# Patient Record
Sex: FEMALE | Race: WHITE | NOT HISPANIC OR LATINO | Employment: FULL TIME | URBAN - METROPOLITAN AREA
[De-identification: names, ages, dates, MRNs, and addresses within clinical notes are randomized per-mention and may not be internally consistent; named-entity substitution may affect disease eponyms.]

---

## 2019-11-06 ENCOUNTER — TRANSCRIBE ORDERS (OUTPATIENT)
Dept: NUTRITION | Facility: HOSPITAL | Age: 29
End: 2019-11-06

## 2019-11-06 DIAGNOSIS — E66.9 OBESITY, CLASS II, BMI 35-39.9: Primary | ICD-10-CM

## 2019-11-25 ENCOUNTER — APPOINTMENT (OUTPATIENT)
Dept: NUTRITION | Facility: HOSPITAL | Age: 29
End: 2019-11-25

## 2020-12-08 ENCOUNTER — LAB (OUTPATIENT)
Dept: LAB | Facility: HOSPITAL | Age: 30
End: 2020-12-08

## 2020-12-08 ENCOUNTER — OFFICE VISIT (OUTPATIENT)
Dept: NEUROLOGY | Facility: CLINIC | Age: 30
End: 2020-12-08

## 2020-12-08 VITALS
BODY MASS INDEX: 44.79 KG/M2 | DIASTOLIC BLOOD PRESSURE: 78 MMHG | HEIGHT: 62 IN | TEMPERATURE: 97.8 F | OXYGEN SATURATION: 99 % | SYSTOLIC BLOOD PRESSURE: 124 MMHG | HEART RATE: 84 BPM | WEIGHT: 243.4 LBS

## 2020-12-08 DIAGNOSIS — Z83.3 FAMILY HISTORY OF DIABETES MELLITUS (DM): ICD-10-CM

## 2020-12-08 DIAGNOSIS — Z83.3 FAMILY HISTORY OF DIABETES MELLITUS (DM): Primary | ICD-10-CM

## 2020-12-08 DIAGNOSIS — O24.419 GESTATIONAL DIABETES MELLITUS (GDM), ANTEPARTUM, GESTATIONAL DIABETES METHOD OF CONTROL UNSPECIFIED: ICD-10-CM

## 2020-12-08 DIAGNOSIS — G93.2 IIH (IDIOPATHIC INTRACRANIAL HYPERTENSION): ICD-10-CM

## 2020-12-08 PROBLEM — H47.10 PAPILLEDEMA: Status: ACTIVE | Noted: 2020-12-08

## 2020-12-08 LAB
BASOPHILS # BLD AUTO: 0.05 10*3/MM3 (ref 0–0.2)
BASOPHILS NFR BLD AUTO: 0.5 % (ref 0–1.5)
DEPRECATED RDW RBC AUTO: 41.5 FL (ref 37–54)
EOSINOPHIL # BLD AUTO: 0.08 10*3/MM3 (ref 0–0.4)
EOSINOPHIL NFR BLD AUTO: 0.7 % (ref 0.3–6.2)
ERYTHROCYTE [DISTWIDTH] IN BLOOD BY AUTOMATED COUNT: 12.9 % (ref 12.3–15.4)
HBA1C MFR BLD: 5.61 % (ref 4.8–5.6)
HCT VFR BLD AUTO: 41.9 % (ref 34–46.6)
HGB BLD-MCNC: 14 G/DL (ref 12–15.9)
IMM GRANULOCYTES # BLD AUTO: 0.04 10*3/MM3 (ref 0–0.05)
IMM GRANULOCYTES NFR BLD AUTO: 0.4 % (ref 0–0.5)
LYMPHOCYTES # BLD AUTO: 2.61 10*3/MM3 (ref 0.7–3.1)
LYMPHOCYTES NFR BLD AUTO: 24.1 % (ref 19.6–45.3)
MCH RBC QN AUTO: 29.5 PG (ref 26.6–33)
MCHC RBC AUTO-ENTMCNC: 33.4 G/DL (ref 31.5–35.7)
MCV RBC AUTO: 88.2 FL (ref 79–97)
MONOCYTES # BLD AUTO: 0.56 10*3/MM3 (ref 0.1–0.9)
MONOCYTES NFR BLD AUTO: 5.2 % (ref 5–12)
NEUTROPHILS NFR BLD AUTO: 69.1 % (ref 42.7–76)
NEUTROPHILS NFR BLD AUTO: 7.49 10*3/MM3 (ref 1.7–7)
NRBC BLD AUTO-RTO: 0 /100 WBC (ref 0–0.2)
PLATELET # BLD AUTO: 303 10*3/MM3 (ref 140–450)
PMV BLD AUTO: 10.8 FL (ref 6–12)
RBC # BLD AUTO: 4.75 10*6/MM3 (ref 3.77–5.28)
WBC # BLD AUTO: 10.83 10*3/MM3 (ref 3.4–10.8)

## 2020-12-08 PROCEDURE — 85025 COMPLETE CBC W/AUTO DIFF WBC: CPT

## 2020-12-08 PROCEDURE — 36415 COLL VENOUS BLD VENIPUNCTURE: CPT

## 2020-12-08 PROCEDURE — 80053 COMPREHEN METABOLIC PANEL: CPT

## 2020-12-08 PROCEDURE — 99244 OFF/OP CNSLTJ NEW/EST MOD 40: CPT | Performed by: NURSE PRACTITIONER

## 2020-12-08 PROCEDURE — 83036 HEMOGLOBIN GLYCOSYLATED A1C: CPT

## 2020-12-08 RX ORDER — ACYCLOVIR 400 MG/1
400 TABLET ORAL 2 TIMES DAILY
COMMUNITY

## 2020-12-08 NOTE — ASSESSMENT & PLAN NOTE
Ordered LP with opening and closing pressures     She will bring me her MRI disc for review     Discussed weight loss measures, she plans to see a weight loss clinic soon.     Discussed medication overuse HA, advised to decrease OTC NSAIDs and tylenol usage to less than 10 days per month.     Started on Aimovig for preventative treatment. First dose given in office, patient V/U of proper usage of autoinjector.     Elavil is contraindicated due to weight, BB contraindicated in the presence of low blood sugar sx.     Check labs for T2DM.     F/U after LP

## 2020-12-08 NOTE — PROGRESS NOTES
Subjective   Patient ID: Dominga Suarez is a 30 y.o. female     No chief complaint on file.       History of Present Illness  30 y.o. 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     HA frequency is daily, located in the occiput and behind bilateral eyes, characteristic of dull aching pain. Moderate to severe.   Associated sx of photophobia, sound sensitivity, blurry vision, nausea, vomiting, pulsatile tinnitus (intermittently), worse with bending over.   Abortive: ibuprofen, excedrin - intermittently helpful. Taking them daily, one or the other.     MRI completed and reportedly normal, she will bring me a disc.     Preventatives: Topamax ( not helpful), SSRI's, contraindicated for BB due to dizziness and low BS sx.     Denies constipation, has episodes of dizziness, shaking, and sweating will eat something sweet and feels better. Has a hx of gestational diabetes, is unsure if he she has been tested for DMT2.     Medical Records reviewed:   Patient with Optic nerve edema bilaterally, reported normal MRI. Has not had an LP completed. Feels like her eyes are swollen every morning, vision is blurry. PMH of migraine.       History reviewed. No pertinent past medical history.  Family History   Problem Relation Age of Onset   • Breast cancer Maternal Grandmother    • Colon cancer Paternal Grandfather      Social History     Socioeconomic History   • Marital status: Single     Spouse name: Not on file   • Number of children: Not on file   • Years of education: Not on file   • Highest education level: Not on file   Tobacco Use   • Smoking status: Current Every Day Smoker   • Smokeless tobacco: Never Used   Substance and Sexual Activity   • Alcohol use: Never     Frequency: Never   • Drug use: Never       Review of Systems   Constitutional: Negative for activity change, fatigue and unexpected weight change.   HENT: Positive for tinnitus. Negative for trouble swallowing.    Eyes: Positive for photophobia and visual  "disturbance.   Respiratory: Negative for apnea, cough and choking.    Cardiovascular: Negative for leg swelling.   Gastrointestinal: Positive for nausea and vomiting.   Endocrine: Negative for cold intolerance and heat intolerance.   Genitourinary: Negative for difficulty urinating, frequency, menstrual problem and urgency.   Musculoskeletal: Negative for back pain, gait problem, myalgias and neck pain.   Skin: Negative for color change and rash.   Allergic/Immunologic: Negative for immunocompromised state.   Neurological: Positive for dizziness and headaches. Negative for tremors, seizures, syncope, facial asymmetry, speech difficulty, weakness, light-headedness and numbness.   Hematological: Negative for adenopathy. Does not bruise/bleed easily.   Psychiatric/Behavioral: Negative for behavioral problems, confusion, decreased concentration, hallucinations and sleep disturbance.       Objective     Vitals:    12/08/20 0859   BP: 124/78   Pulse: 84   Temp: 97.8 °F (36.6 °C)   TempSrc: Temporal   SpO2: 99%   Weight: 110 kg (243 lb 6.4 oz)   Height: 157.5 cm (62\")       Neurologic Exam     Mental Status   Oriented to person, place, and time.   Registration: recalls 3 of 3 objects. Recall at 5 minutes: recalls 3 of 3 objects. Follows 3 step commands.   Attention: normal. Concentration: normal.   Speech: speech is normal   Level of consciousness: alert  Knowledge: good and consistent with education.   Able to name object. Able to read. Able to repeat. Able to write. Normal comprehension.     Cranial Nerves     CN II   Visual fields full to confrontation.   Visual acuity: normal  Right visual field deficit: none  Left visual field deficit: none     CN III, IV, VI   Extraocular motions are normal.   Right pupil: Shape: regular. Reactivity: brisk. Consensual response: intact.   Left pupil: Shape: regular. Reactivity: brisk. Consensual response: intact.   Nystagmus: none   Diplopia: none  Ophthalmoparesis: none  Upgaze: " normal  Downgaze: normal  Conjugate gaze: present  Vestibulo-ocular reflex: present    CN V   Facial sensation intact.   Right corneal reflex: normal  Left corneal reflex: normal    CN VII   Right facial weakness: none  Left facial weakness: none    CN VIII   Hearing: intact    CN IX, X   Palate: symmetric  Right gag reflex: normal  Left gag reflex: normal    CN XI   Right sternocleidomastoid strength: normal  Left sternocleidomastoid strength: normal    CN XII   Tongue: not atrophic  Fasciculations: absent  Tongue deviation: none    Motor Exam   Muscle bulk: normal  Overall muscle tone: normal  Right arm tone: normal  Left arm tone: normal  Right leg tone: normal  Left leg tone: normal    Strength   Strength 5/5 throughout.     Sensory Exam   Light touch normal.   Vibration normal.   Proprioception normal.   Pinprick normal.     Gait, Coordination, and Reflexes     Gait  Gait: normal    Coordination   Romberg: negative  Finger to nose coordination: normal  Heel to shin coordination: normal  Tandem walking coordination: normal    Tremor   Resting tremor: absent  Intention tremor: absent  Action tremor: absent    Reflexes   Reflexes 2+ except as noted.       Physical Exam  Vitals signs and nursing note reviewed.   Constitutional:       Appearance: Normal appearance.   HENT:      Head: Normocephalic and atraumatic.   Eyes:      Extraocular Movements: Extraocular movements intact and EOM normal.      Funduscopic exam:     Right eye: Papilledema present. No hemorrhage or exudate.         Left eye: Papilledema present. No hemorrhage or exudate.   Cardiovascular:      Rate and Rhythm: Normal rate.   Pulmonary:      Effort: Pulmonary effort is normal.   Skin:     General: Skin is warm and dry.      Capillary Refill: Capillary refill takes less than 2 seconds.   Neurological:      Mental Status: She is alert and oriented to person, place, and time.      Coordination: Finger-Nose-Finger Test, Heel to Shin Test and Romberg  Test normal.      Gait: Gait is intact. Tandem walk normal.      Deep Tendon Reflexes: Strength normal.   Psychiatric:         Mood and Affect: Mood normal.         Speech: Speech normal.         Behavior: Behavior normal.         No results found for any previous visit.         Assessment/Plan     Problem List Items Addressed This Visit        Endocrine    Gestational diabetes mellitus (GDM), antepartum    Current Assessment & Plan     Check labs for T2DM     Advised patient become established with a PCP, she states she has seen Dr. Seo in the past and may return to him.          Relevant Orders    CBC & Differential    Comprehensive Metabolic Panel    Hemoglobin A1c       Nervous and Auditory    IIH (idiopathic intracranial hypertension)    Current Assessment & Plan     Ordered LP with opening and closing pressures     She will bring me her MRI disc for review     Discussed weight loss measures, she plans to see a weight loss clinic soon.     Discussed medication overuse HA, advised to decrease OTC NSAIDs and tylenol usage to less than 10 days per month.     Started on Aimovig for preventative treatment. First dose given in office, patient V/U of proper usage of autoinjector.     Elavil is contraindicated due to weight, BB contraindicated in the presence of low blood sugar sx.     Check labs for T2DM.     F/U after LP          Relevant Orders    CT Lumbar Puncture Diagnostic      Other Visit Diagnoses     Family history of diabetes mellitus (DM)    -  Primary    Relevant Orders    CBC & Differential    Comprehensive Metabolic Panel    Hemoglobin A1c             Return for Recheck after LP .

## 2020-12-08 NOTE — ASSESSMENT & PLAN NOTE
Check labs for T2DM     Advised patient become established with a PCP, she states she has seen Dr. Seo in the past and may return to him.

## 2020-12-09 LAB
ALBUMIN SERPL-MCNC: 4.5 G/DL (ref 3.5–5.2)
ALBUMIN/GLOB SERPL: 1.4 G/DL
ALP SERPL-CCNC: 95 U/L (ref 39–117)
ALT SERPL W P-5'-P-CCNC: 22 U/L (ref 1–33)
ANION GAP SERPL CALCULATED.3IONS-SCNC: 10.4 MMOL/L (ref 5–15)
AST SERPL-CCNC: 18 U/L (ref 1–32)
BILIRUB SERPL-MCNC: 0.4 MG/DL (ref 0–1.2)
BUN SERPL-MCNC: 10 MG/DL (ref 6–20)
BUN/CREAT SERPL: 15.9 (ref 7–25)
CALCIUM SPEC-SCNC: 9.5 MG/DL (ref 8.6–10.5)
CHLORIDE SERPL-SCNC: 104 MMOL/L (ref 98–107)
CO2 SERPL-SCNC: 27.6 MMOL/L (ref 22–29)
CREAT SERPL-MCNC: 0.63 MG/DL (ref 0.57–1)
GFR SERPL CREATININE-BSD FRML MDRD: 110 ML/MIN/1.73
GLOBULIN UR ELPH-MCNC: 3.2 GM/DL
GLUCOSE SERPL-MCNC: 77 MG/DL (ref 65–99)
POTASSIUM SERPL-SCNC: 4.5 MMOL/L (ref 3.5–5.2)
PROT SERPL-MCNC: 7.7 G/DL (ref 6–8.5)
SODIUM SERPL-SCNC: 142 MMOL/L (ref 136–145)

## 2020-12-15 ENCOUNTER — TELEPHONE (OUTPATIENT)
Dept: NEUROLOGY | Facility: CLINIC | Age: 30
End: 2020-12-15

## 2020-12-15 NOTE — TELEPHONE ENCOUNTER
Labs are normal.   Her HA1C is slightly elevated but not elevated enough to be diabetic or pre-diabetic. I advise she watch her carb and sugar intake.

## 2020-12-22 ENCOUNTER — HOSPITAL ENCOUNTER (OUTPATIENT)
Dept: GENERAL RADIOLOGY | Facility: HOSPITAL | Age: 30
Discharge: HOME | End: 2020-12-22
Admitting: RADIOLOGY

## 2020-12-22 VITALS
RESPIRATION RATE: 12 BRPM | HEART RATE: 109 BPM | DIASTOLIC BLOOD PRESSURE: 63 MMHG | HEIGHT: 62 IN | OXYGEN SATURATION: 98 % | SYSTOLIC BLOOD PRESSURE: 119 MMHG | BODY MASS INDEX: 44.5 KG/M2 | TEMPERATURE: 98 F | WEIGHT: 241.8 LBS

## 2020-12-22 DIAGNOSIS — G93.2 IIH (IDIOPATHIC INTRACRANIAL HYPERTENSION): ICD-10-CM

## 2020-12-22 LAB
APPEARANCE CSF: CLEAR
COLOR CSF: COLORLESS
GLUCOSE CSF-MCNC: 65 MG/DL (ref 40–70)
PROT CSF-MCNC: 19.2 MG/DL (ref 15–45)
RBC # CSF MANUAL: 3 /MM3 (ref 0–5)
SPECIMEN VOL CSF: 10.5 ML
TUBE # CSF: 1
WBC # CSF MANUAL: 2 /MM3 (ref 0–5)

## 2020-12-22 PROCEDURE — 89050 BODY FLUID CELL COUNT: CPT | Performed by: NURSE PRACTITIONER

## 2020-12-22 PROCEDURE — 84157 ASSAY OF PROTEIN OTHER: CPT | Performed by: NURSE PRACTITIONER

## 2020-12-22 PROCEDURE — 88112 CYTOPATH CELL ENHANCE TECH: CPT | Performed by: NURSE PRACTITIONER

## 2020-12-22 PROCEDURE — 82945 GLUCOSE OTHER FLUID: CPT | Performed by: NURSE PRACTITIONER

## 2020-12-22 RX ORDER — LIDOCAINE HYDROCHLORIDE 10 MG/ML
5 INJECTION, SOLUTION EPIDURAL; INFILTRATION; INTRACAUDAL; PERINEURAL ONCE
Status: COMPLETED | OUTPATIENT
Start: 2020-12-22 | End: 2020-12-22

## 2020-12-22 RX ORDER — SODIUM CHLORIDE 0.9 % (FLUSH) 0.9 %
3 SYRINGE (ML) INJECTION EVERY 12 HOURS SCHEDULED
Status: DISCONTINUED | OUTPATIENT
Start: 2020-12-22 | End: 2020-12-23 | Stop reason: HOSPADM

## 2020-12-22 RX ORDER — SODIUM CHLORIDE 0.9 % (FLUSH) 0.9 %
10 SYRINGE (ML) INJECTION AS NEEDED
Status: DISCONTINUED | OUTPATIENT
Start: 2020-12-22 | End: 2020-12-23 | Stop reason: HOSPADM

## 2020-12-22 RX ADMIN — LIDOCAINE HYDROCHLORIDE 5 ML: 10 INJECTION, SOLUTION EPIDURAL; INFILTRATION; INTRACAUDAL; PERINEURAL at 11:09

## 2020-12-22 NOTE — POST-PROCEDURE NOTE
Radiology Procedure    Pre-procedure: procedure, risks discussed with patient. Patient indicated understanding and consented to procedure.   Procedure Performed: lumbar puncture     IV Sedation and/or Anesthesia:  No    Complications: none    Preliminary Findings: opening pressure 31cm H2O, closing pressure 19cm H2O    Specimen Removed: 10cc clear, colorless CSF    Estimated Blood Loss:  0ml    Post-Procedure Diagnosis: pending    Post-Procedure Plan: encourage fluids, bed rest x 2 hours    Standard Discharge Instructions Given:yes     CARROLL Lemon  12/22/20  11:03 EST

## 2020-12-23 ENCOUNTER — TELEPHONE (OUTPATIENT)
Dept: INFUSION THERAPY | Facility: HOSPITAL | Age: 30
End: 2020-12-23

## 2020-12-23 LAB
LAB AP CASE REPORT: NORMAL
PATH REPORT.FINAL DX SPEC: NORMAL

## 2020-12-28 ENCOUNTER — TELEPHONE (OUTPATIENT)
Dept: NEUROLOGY | Facility: CLINIC | Age: 30
End: 2020-12-28

## 2020-12-28 NOTE — TELEPHONE ENCOUNTER
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     Thanks!

## 2020-12-30 ENCOUNTER — OFFICE VISIT (OUTPATIENT)
Dept: NEUROLOGY | Facility: CLINIC | Age: 30
End: 2020-12-30

## 2020-12-30 ENCOUNTER — SPECIALTY PHARMACY (OUTPATIENT)
Dept: ONCOLOGY | Facility: HOSPITAL | Age: 30
End: 2020-12-30

## 2020-12-30 VITALS
HEIGHT: 62 IN | TEMPERATURE: 97.8 F | WEIGHT: 241 LBS | BODY MASS INDEX: 44.35 KG/M2 | DIASTOLIC BLOOD PRESSURE: 68 MMHG | SYSTOLIC BLOOD PRESSURE: 122 MMHG

## 2020-12-30 DIAGNOSIS — G93.2 IIH (IDIOPATHIC INTRACRANIAL HYPERTENSION): ICD-10-CM

## 2020-12-30 DIAGNOSIS — E66.01 MORBIDLY OBESE (HCC): ICD-10-CM

## 2020-12-30 PROCEDURE — 99212 OFFICE O/P EST SF 10 MIN: CPT | Performed by: NURSE PRACTITIONER

## 2020-12-30 RX ORDER — ACETAZOLAMIDE 250 MG/1
500 TABLET ORAL 2 TIMES DAILY
Qty: 120 TABLET | Refills: 2 | Status: SHIPPED | OUTPATIENT
Start: 2020-12-30 | End: 2022-03-01

## 2020-12-30 RX ORDER — ERENUMAB-AOOE 70 MG/ML
70 INJECTION SUBCUTANEOUS
Qty: 1 ML | Refills: 11 | Status: SHIPPED | OUTPATIENT
Start: 2020-12-30 | End: 2020-12-30

## 2020-12-30 RX ORDER — IBUPROFEN 800 MG/1
TABLET ORAL
COMMUNITY
Start: 2020-12-20 | End: 2022-03-01

## 2020-12-30 RX ORDER — SODIUM FLUORIDE 1.1 G/100G
GEL, DENTIFRICE ORAL
COMMUNITY
Start: 2020-12-21 | End: 2022-03-01

## 2020-12-30 RX ORDER — SUMATRIPTAN 50 MG/1
TABLET, FILM COATED ORAL
Qty: 12 TABLET | Refills: 2 | Status: SHIPPED | OUTPATIENT
Start: 2020-12-30 | End: 2022-03-04

## 2020-12-30 NOTE — PROGRESS NOTES
Subjective   Patient ID: Dominga Suarez is a 30 y.o. female     Chief Complaint   Patient presents with   • Idiopathic Intracranial Hypertension     Post Lumbar Puncture         History of Present Illness  30 y.o. 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 At last appointment on 12/8/20 ordered labs and LP, Started Aimovig.     LP 12/22/20 with opening pressure of 31 cm H2O. CSF: Cell count, Glucose, protein, cytology - NCS.     Labs 12/8/20: CBC, CMP - NCS, HA1C 5.6 - advised patient monitor sugar intake.     HA frequency 3-4 in the last month, mild improvement in associated symptoms, ibuprofen/excedrin continues to be non-abortive.  Denies side effects of Aimovig.      Planning for weight loss surgery in March.     Problem History:    HA frequency is daily, located in the occiput and behind bilateral eyes, characteristic of dull aching pain. Moderate to severe.   Associated sx of photophobia, sound sensitivity, blurry vision, nausea, vomiting, pulsatile tinnitus (intermittently), worse with bending over.   Abortive: ibuprofen, excedrin - intermittently helpful. Taking them daily, one or the other.     MRI completed and reportedly normal, she will bring me a disc.     Preventatives: Topamax ( not helpful), SSRI's, contraindicated for BB due to dizziness and low BS sx.     Denies constipation, has episodes of dizziness, shaking, and sweating will eat something sweet and feels better. Has a hx of gestational diabetes, is unsure if he she has been tested for DMT2.     Medical Records reviewed:   Patient with Optic nerve edema bilaterally, reported normal MRI. Has not had an LP completed. Feels like her eyes are swollen every morning, vision is blurry. PMH of migraine.       Past Medical History:   Diagnosis Date   • Calcium kidney stones    • Chronic headaches    • Herpes simplex      Family History   Problem Relation Age of Onset   • Breast cancer Maternal Grandmother    • Colon cancer Paternal Grandfather   "    Social History     Socioeconomic History   • Marital status: Single     Spouse name: Not on file   • Number of children: Not on file   • Years of education: Not on file   • Highest education level: Not on file   Tobacco Use   • Smoking status: Current Every Day Smoker   • Smokeless tobacco: Never Used   Substance and Sexual Activity   • Alcohol use: Never     Frequency: Never   • Drug use: Never   • Sexual activity: Defer       Review of Systems   Constitutional: Negative for activity change, fatigue and unexpected weight change.   HENT: Positive for tinnitus. Negative for trouble swallowing.    Eyes: Positive for photophobia and visual disturbance.   Respiratory: Negative for apnea, cough and choking.    Cardiovascular: Negative for leg swelling.   Gastrointestinal: Positive for nausea and vomiting.   Endocrine: Negative for cold intolerance and heat intolerance.   Genitourinary: Negative for difficulty urinating, frequency, menstrual problem and urgency.   Musculoskeletal: Negative for back pain, gait problem, myalgias and neck pain.   Skin: Negative for color change and rash.   Allergic/Immunologic: Negative for immunocompromised state.   Neurological: Positive for dizziness and headaches. Negative for tremors, seizures, syncope, facial asymmetry, speech difficulty, weakness, light-headedness and numbness.   Hematological: Negative for adenopathy. Does not bruise/bleed easily.   Psychiatric/Behavioral: Negative for behavioral problems, confusion, decreased concentration, hallucinations and sleep disturbance.       Objective     Vitals:    12/30/20 0928   BP: 122/68   Temp: 97.8 °F (36.6 °C)   Weight: 109 kg (241 lb)   Height: 157.5 cm (62\")       Neurologic Exam     Mental Status   Oriented to person, place, and time.   Follows 3 step commands.   Attention: normal. Concentration: normal.   Speech: speech is normal   Level of consciousness: alert  Knowledge: good and consistent with education.   Able to name " object. Able to read. Able to repeat. Able to write. Normal comprehension.     Cranial Nerves     CN II   Visual fields full to confrontation.   Visual acuity: normal  Right visual field deficit: none  Left visual field deficit: none     CN III, IV, VI   Extraocular motions are normal.   Right pupil: Shape: regular. Reactivity: brisk. Consensual response: intact.   Left pupil: Shape: regular. Reactivity: brisk. Consensual response: intact.   Nystagmus: none   Diplopia: none  Ophthalmoparesis: none  Upgaze: normal  Downgaze: normal  Conjugate gaze: present  Vestibulo-ocular reflex: present    CN V   Facial sensation intact.   Right corneal reflex: normal  Left corneal reflex: normal    CN VII   Right facial weakness: none  Left facial weakness: none    CN VIII   Hearing: intact    CN IX, X   Palate: symmetric  Right gag reflex: normal  Left gag reflex: normal    CN XI   Right sternocleidomastoid strength: normal  Left sternocleidomastoid strength: normal    CN XII   Tongue: not atrophic  Fasciculations: absent  Tongue deviation: none    Motor Exam   Muscle bulk: normal  Overall muscle tone: normal  Right arm tone: normal  Left arm tone: normal  Right leg tone: normal  Left leg tone: normal    Strength   Strength 5/5 throughout.     Sensory Exam   Light touch normal.     Gait, Coordination, and Reflexes     Gait  Gait: normal    Tremor   Resting tremor: absent  Intention tremor: absent  Action tremor: absent    Reflexes   Reflexes 2+ except as noted.       Physical Exam  Vitals signs and nursing note reviewed.   Constitutional:       Appearance: Normal appearance.   HENT:      Head: Normocephalic and atraumatic.   Eyes:      Extraocular Movements: EOM normal.   Neurological:      Mental Status: She is alert and oriented to person, place, and time.      Gait: Gait is intact.      Deep Tendon Reflexes: Strength normal.   Psychiatric:         Mood and Affect: Mood normal.         Speech: Speech normal.         Behavior:  Behavior normal.         Hospital Outpatient Visit on 12/22/2020   Component Date Value Ref Range Status   • Glucose, CSF 12/22/2020 65  40 - 70 mg/dL Final   • Case Report 12/22/2020    Final                    Value:Non-gynecologic Cytology                          Case: [id number]                                  Authorizing Provider:  Mary Jane Diaz,     Collected:           12/22/2020 11:11 AM                                 APRN                                                                         Ordering Location:     [facility]   Received:            12/22/2020 01:42 PM                                 XRAY                                                                         Pathologist:           Edi Hines MD                                                            Specimen:    CSF Sholes                                                                             • Final Diagnosis 12/22/2020    Final                    Value:This result contains rich text formatting which cannot be displayed here.   • Protein, Total (CSF) 12/22/2020 19.2  15.0 - 45.0 mg/dL Final   • WBC, CSF 12/22/2020 2  0 - 5 /mm3 Final   • RBC, CSF 12/22/2020 3  0 - 5 /mm3 Final   • Color, CSF 12/22/2020 Colorless  Colorless Final   • Appearance, CSF 12/22/2020 Clear  Clear Final   • Volume, CSF 12/22/2020 10.5  mL Final   • Tube Number, CSF 12/22/2020 1   Final         Assessment/Plan     Problem List Items Addressed This Visit        Other    IIH (idiopathic intracranial hypertension)    Current Assessment & Plan     Continue Aimovig     Start Diamox 250 mg PO BID X 1 week, then 500 mg PO BID thereafter     Start Imitrex 50 mg PO PRN for migraine, may repeat once in 2 hours if needed     F/U in 2 months or sooner if needed          Relevant Medications    acetaZOLAMIDE (DIAMOX) 250 MG tablet    SUMAtriptan (Imitrex) 50 MG tablet    Morbidly obese (CMS/HCC)    Current Assessment & Plan     Planning  for weight loss surgery in March     Advised patient to continue to work on weight loss and goals prior to surgery                   Return in about 2 months (around 2/28/2021).

## 2020-12-30 NOTE — ASSESSMENT & PLAN NOTE
Continue Aimovig     Start Diamox 250 mg PO BID X 1 week, then 500 mg PO BID thereafter     Start Imitrex 50 mg PO PRN for migraine, may repeat once in 2 hours if needed     F/U in 2 months or sooner if needed

## 2020-12-30 NOTE — ASSESSMENT & PLAN NOTE
Planning for weight loss surgery in March     Advised patient to continue to work on weight loss and goals prior to surgery

## 2021-01-27 ENCOUNTER — SPECIALTY PHARMACY (OUTPATIENT)
Dept: ONCOLOGY | Facility: HOSPITAL | Age: 31
End: 2021-01-27

## 2021-01-27 RX ORDER — ERENUMAB-AOOE 70 MG/ML
70 INJECTION SUBCUTANEOUS
Qty: 1 ML | Refills: 1 | Status: SHIPPED | OUTPATIENT
Start: 2021-01-27 | End: 2021-02-25

## 2021-01-27 RX ORDER — ERENUMAB-AOOE 70 MG/ML
70 INJECTION SUBCUTANEOUS
Qty: 1 ML | Refills: 11 | Status: SHIPPED | OUTPATIENT
Start: 2021-01-27 | End: 2021-01-27 | Stop reason: SDUPTHER

## 2021-01-27 NOTE — PROGRESS NOTES
Injectable Neurology Medication Follow-Up        Patient Name/:     Dominga Suarez    1990  Injectable Neurology Medication Regimen:  Aimovig 70mg SQ Q28 days  Date Started Medication: 2020               Initial Teaching Follow Up Comments      Safety      Storage instructions (away from children; away from heat/cold, sunlight, or moisture)       “How are you storing your medications?”, reminders on storage, proper handling (away from children, managing waste, etc.), disposal of medication with D/C or dosage change     Patient reports appropriate storage and handling.      Adherence       patient and/or caregiver on how to take medication, take with/without food, assess their adherence potential, stress importance of adherence, ways to manage adherence (pill boxes, phone reminders, calendars), what to do if miss a dose    “How are you taking your medication?” “How are you remembering to take your medication?”, “How many doses have you missed?”, determine reasons for non-adherence (not remembering, side effects, etc), ways to improve, overadherence? Remind patient of ways to improve/maintain adherence Confirmed dose of Aimovig 70mg SQ Q28 days (d/t insurance denial of 140mg dosage without trial of 70mg x 3 months). Discussed importance of compliance. LD given in office on 2020. Next self-injection of Aimovig due 2021. Script processed at [facility] and mailed to patient.     Side Effects/Adverse Reactions       patient on potential side effects, s/s, ways to manage, when to call MD/seek help       Determine if patient experiencing side effects, ways to manage  Pt denies adverse effects.      Miscellaneous      Food interactions, DDIs, financial issues Determine if patient started any new medications (analyze for DDI)       Additional Notes: Discussed aforementioned material with patient by phone. All questions and concerns addressed. Patient  provided with my contact information and instructed to call if any additional questions arise. Notified [facility] of refill request.

## 2021-02-25 ENCOUNTER — SPECIALTY PHARMACY (OUTPATIENT)
Dept: ONCOLOGY | Facility: HOSPITAL | Age: 31
End: 2021-02-25

## 2021-02-25 RX ORDER — ERENUMAB-AOOE 70 MG/ML
70 INJECTION SUBCUTANEOUS
Qty: 1 ML | Refills: 11 | Status: SHIPPED | OUTPATIENT
Start: 2021-02-25 | End: 2021-03-26

## 2021-02-25 NOTE — PROGRESS NOTES
Injectable Neurology Medication Follow-Up        Patient Name/:     Dominga Suarez    1990  Injectable Neurology Medication Regimen:  Aimovig 70mg SQ Q28 days  Date Started Medication: 2020               Initial Teaching Follow Up Comments      Safety      Storage instructions (away from children; away from heat/cold, sunlight, or moisture)       “How are you storing your medications?”, reminders on storage, proper handling (away from children, managing waste, etc.), disposal of medication with D/C or dosage change     Patient reports appropriate storage and handling.      Adherence       patient and/or caregiver on how to take medication, take with/without food, assess their adherence potential, stress importance of adherence, ways to manage adherence (pill boxes, phone reminders, calendars), what to do if miss a dose    “How are you taking your medication?” “How are you remembering to take your medication?”, “How many doses have you missed?”, determine reasons for non-adherence (not remembering, side effects, etc), ways to improve, overadherence? Remind patient of ways to improve/maintain adherence Confirmed dose of Aimovig 70mg SQ Q28 days (d/t insurance denial of 140mg dosage without trial of 70mg x 3 months). Discussed importance of compliance. LD given in office on 2020. Next self-injection of Aimovig due 3/2/2021. Refill processed at [facility] retail and mailed to patient.     Side Effects/Adverse Reactions       patient on potential side effects, s/s, ways to manage, when to call MD/seek help       Determine if patient experiencing side effects, ways to manage  Pt denies adverse effects.      Miscellaneous      Food interactions, DDIs, financial issues Determine if patient started any new medications (analyze for DDI)       Additional Notes: Discussed aforementioned material with patient by phone. All questions and concerns addressed. Patient  provided with my contact information and instructed to call if any additional questions arise. Notified [facility] of refill request.

## 2021-03-26 ENCOUNTER — SPECIALTY PHARMACY (OUTPATIENT)
Dept: ONCOLOGY | Facility: HOSPITAL | Age: 31
End: 2021-03-26

## 2021-03-26 NOTE — PROGRESS NOTES
Injectable Neurology Medication Follow-Up        Patient Name/:     Dominga Suarez    1990  Injectable Neurology Medication Regimen:  Aimovig 140mg SQ Q28 days  Date Started Medication: pending acquisition               Initial Teaching Follow Up Comments      Safety      Storage instructions (away from children; away from heat/cold, sunlight, or moisture)       “How are you storing your medications?”, reminders on storage, proper handling (away from children, managing waste, etc.), disposal of medication with D/C or dosage change     Patient reports appropriate storage and handling.      Adherence       patient and/or caregiver on how to take medication, take with/without food, assess their adherence potential, stress importance of adherence, ways to manage adherence (pill boxes, phone reminders, calendars), what to do if miss a dose    “How are you taking your medication?” “How are you remembering to take your medication?”, “How many doses have you missed?”, determine reasons for non-adherence (not remembering, side effects, etc), ways to improve, overadherence? Remind patient of ways to improve/maintain adherence Confirmed dose of Aimovig 140mg SQ Q28 days. Patient completed trial of  70mg x 3 months and PA was resubmitted for higher dosage and was approved. Discussed importance of compliance. Next self-injection of Aimovig due 3/30/2021. Refill processed at [facility] retail and mailed to patient.     Side Effects/Adverse Reactions       patient on potential side effects, s/s, ways to manage, when to call MD/seek help       Determine if patient experiencing side effects, ways to manage  Pt denies adverse effects.      Miscellaneous      Food interactions, DDIs, financial issues Determine if patient started any new medications (analyze for DDI) Scheduled delivery for 3/30.      Additional Notes: Discussed aforementioned material with patient by phone. All  questions and concerns addressed. Patient provided with my contact information and instructed to call if any additional questions arise. Notified [facility] of refill request.

## 2021-04-23 ENCOUNTER — SPECIALTY PHARMACY (OUTPATIENT)
Dept: ONCOLOGY | Facility: HOSPITAL | Age: 31
End: 2021-04-23

## 2021-04-23 NOTE — PROGRESS NOTES
Injectable Neurology Medication Follow-Up        Patient Name/:     Dominga Suarez    1990  Injectable Neurology Medication Regimen:  Aimovig 140mg SQ Q28 days  Date Started Medication: pending acquisition               Initial Teaching Follow Up Comments      Safety      Storage instructions (away from children; away from heat/cold, sunlight, or moisture)       “How are you storing your medications?”, reminders on storage, proper handling (away from children, managing waste, etc.), disposal of medication with D/C or dosage change     Patient reports appropriate storage and handling.      Adherence       patient and/or caregiver on how to take medication, take with/without food, assess their adherence potential, stress importance of adherence, ways to manage adherence (pill boxes, phone reminders, calendars), what to do if miss a dose    “How are you taking your medication?” “How are you remembering to take your medication?”, “How many doses have you missed?”, determine reasons for non-adherence (not remembering, side effects, etc), ways to improve, overadherence? Remind patient of ways to improve/maintain adherence Confirmed dose of Aimovig 140mg SQ Q28 days. Patient completed trial of  70mg x 3 months and PA was resubmitted for higher dosage and was approved. Discussed importance of compliance. Next self-injection of Aimovig due 2021. Refill processed at [facility] retail and mailed to patient.     Side Effects/Adverse Reactions       patient on potential side effects, s/s, ways to manage, when to call MD/seek help       Determine if patient experiencing side effects, ways to manage  Pt denies adverse effects and reports increased efficacy since switching to 140mg dosage.      Miscellaneous      Food interactions, DDIs, financial issues Determine if patient started any new medications (analyze for DDI) Scheduled shipment on  for delivery to patient on  4/27 via [employer].      Additional Notes: Discussed aforementioned material with patient by phone. All questions and concerns addressed. Patient provided with my contact information and instructed to call if any additional questions arise. Notified [facility] retail of refill request.

## 2021-05-18 ENCOUNTER — SPECIALTY PHARMACY (OUTPATIENT)
Dept: ONCOLOGY | Facility: HOSPITAL | Age: 31
End: 2021-05-18

## 2021-05-18 NOTE — PROGRESS NOTES
Injectable Neurology Medication Follow-Up        Patient Name/:     Dominga Suarez    1990  Injectable Neurology Medication Regimen:  Aimovig 140mg SQ Q28 days  Date Started Medication: 3/30/2021               Initial Teaching Follow Up Comments      Safety      Storage instructions (away from children; away from heat/cold, sunlight, or moisture)       “How are you storing your medications?”, reminders on storage, proper handling (away from children, managing waste, etc.), disposal of medication with D/C or dosage change     Patient reports appropriate storage and handling.      Adherence       patient and/or caregiver on how to take medication, take with/without food, assess their adherence potential, stress importance of adherence, ways to manage adherence (pill boxes, phone reminders, calendars), what to do if miss a dose    “How are you taking your medication?” “How are you remembering to take your medication?”, “How many doses have you missed?”, determine reasons for non-adherence (not remembering, side effects, etc), ways to improve, overadherence? Remind patient of ways to improve/maintain adherence Confirmed dose of Aimovig 140mg SQ Q28 days. Patient completed trial of  70mg x 3 months and PA was resubmitted for higher dosage and was approved. Discussed importance of compliance. Next self-injection of Aimovig due 2021. Refill processed at [facility] retail and mailed to patient.     Side Effects/Adverse Reactions       patient on potential side effects, s/s, ways to manage, when to call MD/seek help       Determine if patient experiencing side effects, ways to manage  Pt denies adverse effects and reports increased efficacy since switching to 140mg dosage.      Miscellaneous      Food interactions, DDIs, financial issues Determine if patient started any new medications (analyze for DDI) Scheduled shipment on  for delivery to patient on  via  [employer].      Additional Notes: Discussed aforementioned material with patient by phone. All questions and concerns addressed. Patient provided with my contact information and instructed to call if any additional questions arise. Notified [facility] retail of refill request.

## 2021-06-15 ENCOUNTER — SPECIALTY PHARMACY (OUTPATIENT)
Dept: ONCOLOGY | Facility: HOSPITAL | Age: 31
End: 2021-06-15

## 2021-06-15 NOTE — PROGRESS NOTES
Injectable Neurology Medication Follow-Up        Patient Name/:     Dominga Suarez    1990  Injectable Neurology Medication Regimen:  Aimovig 140mg SQ Q28 days  Date Started Medication: 3/30/2021               Initial Teaching Follow Up Comments      Safety      Storage instructions (away from children; away from heat/cold, sunlight, or moisture)       “How are you storing your medications?”, reminders on storage, proper handling (away from children, managing waste, etc.), disposal of medication with D/C or dosage change     Patient reports appropriate storage and handling.      Adherence       patient and/or caregiver on how to take medication, take with/without food, assess their adherence potential, stress importance of adherence, ways to manage adherence (pill boxes, phone reminders, calendars), what to do if miss a dose    “How are you taking your medication?” “How are you remembering to take your medication?”, “How many doses have you missed?”, determine reasons for non-adherence (not remembering, side effects, etc), ways to improve, overadherence? Remind patient of ways to improve/maintain adherence Confirmed dose of Aimovig 140mg SQ Q28 days. Patient completed trial of  70mg x 3 months and PA was resubmitted for higher dosage and was approved. Discussed importance of compliance. Next self-injection of Aimovig due 2021. Refill processed at [employer] retail and mailed to patient.     Side Effects/Adverse Reactions       patient on potential side effects, s/s, ways to manage, when to call MD/seek help       Determine if patient experiencing side effects, ways to manage  Pt denies adverse effects and reports increased efficacy since switching to 140mg dosage. Reports mild constipation. Will report any new or worsening sx.      Miscellaneous      Food interactions, DDIs, financial issues Determine if patient started any new medications (analyze for DDI)  Chief Complaint   Patient presents with     Recheck Medication   NATTY    Reviewed allergies, smoking status, and pharmacy preference  Administered abuse screening questions   Obtained weight, blood pressure and heart rate      Scheduled shipment on 6/16 for delivery to patient on 6/17 via [employer].      Additional Notes: Discussed aforementioned material with patient by phone. All questions and concerns addressed. Patient provided with my contact information and instructed to call if any additional questions arise. Notified [facility] of refill request.

## 2021-07-12 ENCOUNTER — SPECIALTY PHARMACY (OUTPATIENT)
Dept: ONCOLOGY | Facility: HOSPITAL | Age: 31
End: 2021-07-12

## 2021-07-12 NOTE — PROGRESS NOTES
Injectable Neurology Medication Follow-Up        Patient Name/:     Dominga Suarez    1990  Injectable Neurology Medication Regimen:  Aimovig 140mg SQ Q28 days  Date Started Medication: 3/30/2021               Initial Teaching Follow Up Comments      Safety      Storage instructions (away from children; away from heat/cold, sunlight, or moisture)       “How are you storing your medications?”, reminders on storage, proper handling (away from children, managing waste, etc.), disposal of medication with D/C or dosage change     Patient reports appropriate storage and handling.      Adherence       patient and/or caregiver on how to take medication, take with/without food, assess their adherence potential, stress importance of adherence, ways to manage adherence (pill boxes, phone reminders, calendars), what to do if miss a dose    “How are you taking your medication?” “How are you remembering to take your medication?”, “How many doses have you missed?”, determine reasons for non-adherence (not remembering, side effects, etc), ways to improve, overadherence? Remind patient of ways to improve/maintain adherence Confirmed dose of Aimovig 140mg SQ Q28 days. Patient completed trial of  70mg x 3 months and PA was resubmitted for higher dosage and was approved. Discussed importance of compliance. Next self-injection of Aimovig due 2021. Refill processed at [facility] retail and mailed to patient.     Side Effects/Adverse Reactions       patient on potential side effects, s/s, ways to manage, when to call MD/seek help       Determine if patient experiencing side effects, ways to manage  Pt denies adverse effects and reports increased efficacy since switching to 140mg dosage.       Miscellaneous      Food interactions, DDIs, financial issues Determine if patient started any new medications (analyze for DDI) Scheduled shipment on  for delivery to patient on 7/15 via  [employer].      Additional Notes: Discussed aforementioned material with patient by phone. All questions and concerns addressed. Patient provided with my contact information and instructed to call if any additional questions arise. Notified [facility] retail of refill request.

## 2021-08-10 ENCOUNTER — SPECIALTY PHARMACY (OUTPATIENT)
Dept: ONCOLOGY | Facility: HOSPITAL | Age: 31
End: 2021-08-10

## 2021-08-10 NOTE — PROGRESS NOTES
Injectable Neurology Medication Follow-Up        Patient Name/:     Dominga Suarez    1990  Injectable Neurology Medication Regimen:  Aimovig 140mg SQ Q28 days  Date Started Medication: 3/30/2021               Initial Teaching Follow Up Comments      Safety      Storage instructions (away from children; away from heat/cold, sunlight, or moisture)       “How are you storing your medications?”, reminders on storage, proper handling (away from children, managing waste, etc.), disposal of medication with D/C or dosage change     Patient reports appropriate storage and handling.      Adherence       patient and/or caregiver on how to take medication, take with/without food, assess their adherence potential, stress importance of adherence, ways to manage adherence (pill boxes, phone reminders, calendars), what to do if miss a dose    “How are you taking your medication?” “How are you remembering to take your medication?”, “How many doses have you missed?”, determine reasons for non-adherence (not remembering, side effects, etc), ways to improve, overadherence? Remind patient of ways to improve/maintain adherence Confirmed dose of Aimovig 140mg SQ Q28 days. Patient completed trial of  70mg x 3 months and PA was resubmitted for higher dosage and was approved. Discussed importance of compliance. Next self-injection of Aimovig due 2021. Refill processed at [facility] retail and mailed to patient.     Side Effects/Adverse Reactions       patient on potential side effects, s/s, ways to manage, when to call MD/seek help       Determine if patient experiencing side effects, ways to manage  Pt denies adverse effects and reports increased efficacy since switching to 140mg dosage.       Miscellaneous      Food interactions, DDIs, financial issues Determine if patient started any new medications (analyze for DDI) Scheduled shipment on  for delivery to patient on  via  [employer].      Additional Notes: Discussed aforementioned material with patient by phone. All questions and concerns addressed. Patient provided with my contact information and instructed to call if any additional questions arise. Notified [facility] retail of refill request.

## 2021-09-08 ENCOUNTER — SPECIALTY PHARMACY (OUTPATIENT)
Dept: ONCOLOGY | Facility: HOSPITAL | Age: 31
End: 2021-09-08

## 2021-09-08 NOTE — PROGRESS NOTES
Injectable Neurology Medication Follow-Up        Patient Name/:     Dominga Suarez    1990  Injectable Neurology Medication Regimen:  Aimovig 140mg SQ Q28 days  Date Started Medication: 3/30/2021               Initial Teaching Follow Up Comments      Safety      Storage instructions (away from children; away from heat/cold, sunlight, or moisture)       “How are you storing your medications?”, reminders on storage, proper handling (away from children, managing waste, etc.), disposal of medication with D/C or dosage change     Patient reports appropriate storage and handling.      Adherence       patient and/or caregiver on how to take medication, take with/without food, assess their adherence potential, stress importance of adherence, ways to manage adherence (pill boxes, phone reminders, calendars), what to do if miss a dose    “How are you taking your medication?” “How are you remembering to take your medication?”, “How many doses have you missed?”, determine reasons for non-adherence (not remembering, side effects, etc), ways to improve, overadherence? Remind patient of ways to improve/maintain adherence Confirmed dose of Aimovig 140mg SQ Q28 days. Patient completed trial of  70mg x 3 months and PA was resubmitted for higher dosage and was approved. Discussed importance of compliance. Next self-injection of Aimovig due 2021. Refill processed at [facility] retail and mailed to patient.     Side Effects/Adverse Reactions       patient on potential side effects, s/s, ways to manage, when to call MD/seek help       Determine if patient experiencing side effects, ways to manage  Pt denies adverse effects and reports increased efficacy since switching to 140mg dosage.       Miscellaneous      Food interactions, DDIs, financial issues Determine if patient started any new medications (analyze for DDI) Scheduled shipment on  for delivery to patient on 9/10 via  [employer].      Additional Notes: Discussed aforementioned material with patient by phone. All questions and concerns addressed. Patient provided with my contact information and instructed to call if any additional questions arise. Notified [facility] retail of refill request.

## 2021-10-13 ENCOUNTER — SPECIALTY PHARMACY (OUTPATIENT)
Dept: ONCOLOGY | Facility: HOSPITAL | Age: 31
End: 2021-10-13

## 2021-10-13 NOTE — PROGRESS NOTES
Injectable Neurology Medication Follow-Up        Patient Name/:     Dominga Suarez    1990  Injectable Neurology Medication Regimen:  Aimovig 140mg SQ Q28 days  Date Started Medication: 3/30/2021               Initial Teaching Follow Up Comments      Safety      Storage instructions (away from children; away from heat/cold, sunlight, or moisture)       “How are you storing your medications?”, reminders on storage, proper handling (away from children, managing waste, etc.), disposal of medication with D/C or dosage change     Patient reports appropriate storage and handling.      Adherence       patient and/or caregiver on how to take medication, take with/without food, assess their adherence potential, stress importance of adherence, ways to manage adherence (pill boxes, phone reminders, calendars), what to do if miss a dose    “How are you taking your medication?” “How are you remembering to take your medication?”, “How many doses have you missed?”, determine reasons for non-adherence (not remembering, side effects, etc), ways to improve, overadherence? Remind patient of ways to improve/maintain adherence Confirmed dose of Aimovig 140mg SQ Q28 days. Patient completed trial of  70mg x 3 months and PA was resubmitted for higher dosage and was approved. Discussed importance of compliance. Next self-injection of Aimovig planned for 10/15/2021. Refill processed at [facility] retail and mailed to patient.     Side Effects/Adverse Reactions       patient on potential side effects, s/s, ways to manage, when to call MD/seek help       Determine if patient experiencing side effects, ways to manage  Pt denies adverse effects and reports increased efficacy since switching to 140mg dosage.       Miscellaneous      Food interactions, DDIs, financial issues Determine if patient started any new medications (analyze for DDI) Scheduled shipment on 10/14 for delivery to patient on  10/15 via [employer].      Additional Notes: Discussed aforementioned material with patient by phone. All questions and concerns addressed. Patient provided with my contact information and instructed to call if any additional questions arise. Notified [facility] retail of refill request.

## 2021-11-04 ENCOUNTER — SPECIALTY PHARMACY (OUTPATIENT)
Dept: ONCOLOGY | Facility: HOSPITAL | Age: 31
End: 2021-11-04

## 2021-11-09 NOTE — PROGRESS NOTES
Specialty Pharmacy Refill Coordination Note     Dominga is a 31 y.o. female contacted today regarding refills of  Aimovig 140mg sq monthly specialty medication(s).    Reviewed and verified with patient: Yes  Specialty medication(s) and dose(s) confirmed: yes                   Follow-up: Plan to follow up in 28 days for refill.     Lisa oRot, Pharmacy Technician  Specialty Pharmacy Technician

## 2021-12-07 ENCOUNTER — SPECIALTY PHARMACY (OUTPATIENT)
Dept: ONCOLOGY | Facility: HOSPITAL | Age: 31
End: 2021-12-07

## 2021-12-07 NOTE — PROGRESS NOTES
Specialty Pharmacy Refill Coordination Note     Dominga is a 31 y.o. female contacted today regarding refills of Aimovig specialty medication(s). Patient is due for injection on 12/10.    Reviewed and verified with patient: Yes  Specialty medication(s) and dose(s) confirmed: yes    Refill Questions      Most Recent Value   Changes to allergies? No   Changes to medications? No   New conditions since last clinic visit No   Unplanned office visit, urgent care, ED, or hospital admission in the last 4 weeks  No   How does patient/caregiver feel medication is working? Excellent   Financial problems or insurance changes  No   How many doses of your specialty medications were missed in the last 4 weeks? 0          Delivery Questions      Most Recent Value   Delivery method [employer]   Delivery address correct? Yes   Preferred delivery time? Anytime   Number of medications in delivery 1   Medication being filled and delivered Aimovig   Doses left of specialty medications 0   Is there any medication that is due not being filled? No   Supplies needed? No supplies needed   Cooler needed? Yes   Copay form of payment Payment plan already set up   Questions or concerns for the pharmacist? No   Are any medications first time fills? No            Medication Adherence    Any gaps in refill history greater than 2 weeks in the last 3 months: no  Demonstrates understanding of importance of adherence: yes  Informant: patient  Reliability of informant: reliable  Provider-estimated medication adherence level: %  Reasons for non-adherence: no problems identified  Adherence tools used: calendar  Support network for adherence: healthcare provider   Other support network: Pharmacy           Follow-up: Plan to reach out to patient in 28 days for refill.     Lisa Root, Pharmacy Technician  Specialty Pharmacy Technician

## 2021-12-30 ENCOUNTER — SPECIALTY PHARMACY (OUTPATIENT)
Dept: ONCOLOGY | Facility: HOSPITAL | Age: 31
End: 2021-12-30

## 2021-12-30 NOTE — PROGRESS NOTES
Specialty Pharmacy Refill Coordination Note     Dominga is a 31 y.o. female contacted today regarding refills of  Aimovig specialty medication(s). Patient is due for injection on 1/7.    Reviewed and verified with patient: Yes  Specialty medication(s) and dose(s) confirmed: yes    Refill Questions      Most Recent Value   Changes to allergies? No   Changes to medications? No   New conditions since last clinic visit No   Unplanned office visit, urgent care, ED, or hospital admission in the last 4 weeks  No   How does patient/caregiver feel medication is working? Excellent   Financial problems or insurance changes  No   How many doses of your specialty medications were missed in the last 4 weeks? 0          Delivery Questions      Most Recent Value   Delivery method [employer]   Delivery address correct? Yes   Preferred delivery time? Anytime   Number of medications in delivery 1   Medication being filled and delivered Aimovig   Doses left of specialty medications 0   Is there any medication that is due not being filled? No   Supplies needed? No supplies needed   Cooler needed? Yes   Do any medications need mixed or dated? No   Copay form of payment Payment plan already set up   Questions or concerns for the pharmacist? No   Are any medications first time fills? No            Medication Adherence    Any gaps in refill history greater than 2 weeks in the last 3 months: no  Demonstrates understanding of importance of adherence: yes  Informant: patient  Reliability of informant: reliable  Provider-estimated medication adherence level: %  Reasons for non-adherence: no problems identified  Adherence tools used: calendar  Support network for adherence: healthcare provider   Other support network: Pharmacy           Follow-up: Plan to reach out to patient in 28 days from dispense for a refill.     Lisa Root, Pharmacy Technician  Specialty Pharmacy Technician

## 2022-01-27 ENCOUNTER — SPECIALTY PHARMACY (OUTPATIENT)
Dept: ONCOLOGY | Facility: HOSPITAL | Age: 32
End: 2022-01-27

## 2022-01-27 NOTE — PROGRESS NOTES
Specialty Pharmacy Refill Coordination Note     Dominga is a 31 y.o. female contacted today regarding refills of  Aimovig specialty medication(s). Patient is due for injection on 2/4. Scheduled fill for 2/3.    Reviewed and verified with patient: Yes  Specialty medication(s) and dose(s) confirmed: yes    Refill Questions      Most Recent Value   Changes to allergies? No   Changes to medications? No   New conditions since last clinic visit No   Unplanned office visit, urgent care, ED, or hospital admission in the last 4 weeks  No   How does patient/caregiver feel medication is working? Excellent   Financial problems or insurance changes  No   If yes, describe changes in insurance or financial issues. N/A   How many doses of your specialty medications were missed in the last 4 weeks? 0   Why were doses missed? N/A   Does this patient require a clinical escalation to a pharmacist? No          Delivery Questions      Most Recent Value   Delivery method [employer]   Delivery address correct? Yes   Preferred delivery time? Anytime   Number of medications in delivery 1   Medication being filled and delivered Aimovig   Doses left of specialty medications 0   Is there any medication that is due not being filled? No   Supplies needed? No supplies needed   Cooler needed? Yes   Do any medications need mixed or dated? No   Copay form of payment Payment plan already set up   Questions or concerns for the pharmacist? No   Are any medications first time fills? No            Medication Adherence    Adherence tools used: calendar  Support network for adherence: healthcare provider   Other support network: Pharmacy           Follow-up: Plan to reach out to patient in 28 days for a refill from dispense.     Lisa Root, Pharmacy Technician  Specialty Pharmacy Technician

## 2022-02-24 ENCOUNTER — SPECIALTY PHARMACY (OUTPATIENT)
Dept: ONCOLOGY | Facility: HOSPITAL | Age: 32
End: 2022-02-24

## 2022-02-24 NOTE — PROGRESS NOTES
Specialty Pharmacy Refill Coordination Note     Dominga is a 31 y.o. female contacted today regarding refills of  Aimovig specialty medication(s). Patient is due for injection on 3/4.    Reviewed and verified with patient: Yes  Specialty medication(s) and dose(s) confirmed: yes    Refill Questions      Most Recent Value   Changes to allergies? No   Changes to medications? No   New conditions since last clinic visit No   Unplanned office visit, urgent care, ED, or hospital admission in the last 4 weeks  No   How does patient/caregiver feel medication is working? Excellent   Financial problems or insurance changes  No   If yes, describe changes in insurance or financial issues. N/A   How many doses of your specialty medications were missed in the last 4 weeks? 0   Why were doses missed? N/A   Does this patient require a clinical escalation to a pharmacist? No          Delivery Questions      Most Recent Value   Delivery method [employer]Ex   Delivery address correct? Yes   Preferred delivery time? Anytime   Number of medications in delivery 1   Medication being filled and delivered Aimovig   Doses left of specialty medications 0   Is there any medication that is due not being filled? No   Supplies needed? No supplies needed   Cooler needed? Yes   Do any medications need mixed or dated? No   Copay form of payment Payment plan already set up   Questions or concerns for the pharmacist? No   Are any medications first time fills? No            Medication Adherence    Adherence tools used: calendar  Support network for adherence: healthcare provider   Other support network: Pharmacy           Follow-up: Plan to reach out to patient in 28 days for a refill.     Lisa Root, Pharmacy Technician  Specialty Pharmacy Technician

## 2022-03-01 ENCOUNTER — SPECIALTY PHARMACY (OUTPATIENT)
Dept: ONCOLOGY | Facility: HOSPITAL | Age: 32
End: 2022-03-01

## 2022-03-01 NOTE — PROGRESS NOTES
Specialty Pharmacy Patient Management Program  Neurology Reassessment     Dominga Suarez is a 31 y.o. female with Migraines seen by a Neurology provider and enrolled in the Neurology Patient Management program offered by [facility].  A follow-up outreach was conducted, including assessment of continued therapy appropriateness, medication adherence, and side effect incidence and management for  Aimovig.     Changes to Insurance Coverage or Financial Support  [place] Medicaid     Relevant Past Medical History and Comorbidities  Relevant medical history and concomitant health conditions were discussed with the patient. The patient's chart has been reviewed for relevant past medical history and comorbid health conditions and updated as necessary.   Past Medical History:   Diagnosis Date   • Calcium kidney stones    • Chronic headaches    • Herpes simplex      Social History     Socioeconomic History   • Marital status: Single   Tobacco Use   • Smoking status: Current Every Day Smoker   • Smokeless tobacco: Never Used   Substance and Sexual Activity   • Alcohol use: Never   • Drug use: Never   • Sexual activity: Defer       Problem list reviewed by Romina Helm, PharmD on 3/1/2022 at  3:36 PM    Allergies  Known allergies and reactions were discussed with the patient. The patient's chart has been reviewed for allergy information and updated as necessary.   Patient has no known allergies.    Allergies reviewed by Romina Helm, PharmD on 3/1/2022 at  3:34 PM    Relevant Laboratory Values          Current Medication List  This medication list has been reviewed with the patient and evaluated for any interactions or necessary modifications/recommendations, and updated to include all prescription medications, OTC medications, and supplements the patient is currently taking.  This list reflects what is contained in the patient's profile, which has also been marked as reviewed to communicate to  other providers it is the most up to date version of the patient's current medication therapy.     Current Outpatient Medications:   •  acyclovir (ZOVIRAX) 400 MG tablet, Take 400 mg by mouth 2 (two) times a day., Disp: , Rfl:   •  Erenumab-aooe (AIMOVIG) 140 MG/ML prefilled syringe, Inject 1 mL under the skin into the appropriate area as directed Every 28 (Twenty-Eight) Days., Disp: 1 mL, Rfl: 0  •  SUMAtriptan (Imitrex) 50 MG tablet, Take one tablet at onset of headache. May repeat dose one time in 2 hours if headache not relieved., Disp: 12 tablet, Rfl: 2    Medicines reviewed by Romina Helm, PharmD on 3/1/2022 at  3:35 PM  Medicines reviewed by Romina Helm, PharmD on 3/1/2022 at  3:35 PM  Medicines reviewed by Romina Helm, PharmD on 3/1/2022 at  3:36 PM    Drug Interactions  none     Adverse Drug Reactions  • Adverse Reactions Experienced: none   • Plan for ADR Management: not required    Hospitalizations and Urgent Care Since Last Assessment  • Hospitalizations or Admissions: none   • ED Visits: none  • Urgent Office Visits: none     Adherence and Self-Administration  Adherence related patient's specialty therapy was discussed with the patient. The Adherence segment of this outreach has been reviewed and updated.     Medication Adherence    Patient reported X missed doses in the last 7 days: 0  Any gaps in refill history greater than 2 weeks in the last 3 months: no  Demonstrates understanding of importance of adherence: yes  Informant: patient  Reliability of informant: reliable  Estimated medication adherence level: %  Adherence estimation source: claims history  Reasons for non-adherence: no problems identified  Adherence tools used: calendar  Support network for adherence: healthcare provider   Other support networks: Pharmacy         • Ongoing or New Barriers to Patient Adherence and/or Self-Administration: none   • Methods for Supporting Patient Adherence and/or Self-Administration:  Aimovig self-injection taught in office prior to starting therapy     Goals of Therapy  Goals related to the patient's specialty therapy was discussed with the patient. The Patient Goals segment of this outreach has been reviewed and updated.   Goals     • Specialty Pharmacy General Goal      Reduction in severity and frequency of migraines             Quality of Life Assessment   Quality of Life related to the patient's specialty therapy was discussed with the patient. The QOL segment of this outreach has been reviewed and updated.     Quality of Life Assessment  Quality of Life Improvement Scale: Moderately better    Reassessment Plan & Follow-Up  1. Medication Therapy Changes: none  2. Additional Plans, Therapy Recommendations, or Therapy Problems to Be Addressed: none  3. Pharmacist to perform regular reassessments no more than (6) months from the previous assessment.  4. Care Coordinator to set up future refill outreaches, coordinate prescription delivery, and escalate clinical questions to pharmacist.     Attestation  I attest that the specialty medication(s) addressed above are appropriate for the patient based on my reassessment.  If the prescribed therapy is at any point deemed not appropriate based on the current or future assessments, a consultation will be initiated with the patient's specialty care provider to determine the best course of action. The revised plan of therapy will be documented along with any additional patient education provided.     Romina Helm, PharmD  3/1/2022  16:36 EST

## 2022-03-04 ENCOUNTER — OFFICE VISIT (OUTPATIENT)
Dept: NEUROLOGY | Facility: CLINIC | Age: 32
End: 2022-03-04

## 2022-03-04 ENCOUNTER — LAB (OUTPATIENT)
Dept: LAB | Facility: HOSPITAL | Age: 32
End: 2022-03-04

## 2022-03-04 VITALS
WEIGHT: 171.4 LBS | TEMPERATURE: 98.9 F | OXYGEN SATURATION: 99 % | BODY MASS INDEX: 31.35 KG/M2 | HEART RATE: 66 BPM | SYSTOLIC BLOOD PRESSURE: 118 MMHG | DIASTOLIC BLOOD PRESSURE: 64 MMHG

## 2022-03-04 DIAGNOSIS — R20.0 NUMBNESS AND TINGLING: ICD-10-CM

## 2022-03-04 DIAGNOSIS — R20.0 NUMBNESS AND TINGLING: Primary | ICD-10-CM

## 2022-03-04 DIAGNOSIS — R20.2 NUMBNESS AND TINGLING: Primary | ICD-10-CM

## 2022-03-04 DIAGNOSIS — G93.2 IIH (IDIOPATHIC INTRACRANIAL HYPERTENSION): Chronic | ICD-10-CM

## 2022-03-04 DIAGNOSIS — R20.2 NUMBNESS AND TINGLING: ICD-10-CM

## 2022-03-04 LAB
25(OH)D3 SERPL-MCNC: 28.2 NG/ML
ALBUMIN SERPL-MCNC: 4.7 G/DL (ref 3.5–5.2)
ALBUMIN/GLOB SERPL: 1.6 G/DL
ALP SERPL-CCNC: 89 U/L (ref 39–117)
ALT SERPL W P-5'-P-CCNC: 14 U/L (ref 1–33)
ANION GAP SERPL CALCULATED.3IONS-SCNC: 10.5 MMOL/L (ref 5–15)
AST SERPL-CCNC: 15 U/L (ref 1–32)
BASOPHILS # BLD AUTO: 0.03 10*3/MM3 (ref 0–0.2)
BASOPHILS NFR BLD AUTO: 0.3 % (ref 0–1.5)
BILIRUB SERPL-MCNC: 0.3 MG/DL (ref 0–1.2)
BUN SERPL-MCNC: 13 MG/DL (ref 6–20)
BUN/CREAT SERPL: 13.7 (ref 7–25)
CALCIUM SPEC-SCNC: 10 MG/DL (ref 8.6–10.5)
CHLORIDE SERPL-SCNC: 103 MMOL/L (ref 98–107)
CO2 SERPL-SCNC: 27.5 MMOL/L (ref 22–29)
CREAT SERPL-MCNC: 0.95 MG/DL (ref 0.57–1)
DEPRECATED RDW RBC AUTO: 42.4 FL (ref 37–54)
EGFRCR SERPLBLD CKD-EPI 2021: 81.3 ML/MIN/1.73
EOSINOPHIL # BLD AUTO: 0.05 10*3/MM3 (ref 0–0.4)
EOSINOPHIL NFR BLD AUTO: 0.5 % (ref 0.3–6.2)
ERYTHROCYTE [DISTWIDTH] IN BLOOD BY AUTOMATED COUNT: 13 % (ref 12.3–15.4)
FOLATE SERPL-MCNC: 4.78 NG/ML (ref 4.78–24.2)
GLOBULIN UR ELPH-MCNC: 2.9 GM/DL
GLUCOSE SERPL-MCNC: 89 MG/DL (ref 65–99)
HCT VFR BLD AUTO: 43.8 % (ref 34–46.6)
HGB BLD-MCNC: 14.6 G/DL (ref 12–15.9)
IMM GRANULOCYTES # BLD AUTO: 0.05 10*3/MM3 (ref 0–0.05)
IMM GRANULOCYTES NFR BLD AUTO: 0.5 % (ref 0–0.5)
LYMPHOCYTES # BLD AUTO: 1.75 10*3/MM3 (ref 0.7–3.1)
LYMPHOCYTES NFR BLD AUTO: 16 % (ref 19.6–45.3)
MCH RBC QN AUTO: 30 PG (ref 26.6–33)
MCHC RBC AUTO-ENTMCNC: 33.3 G/DL (ref 31.5–35.7)
MCV RBC AUTO: 89.9 FL (ref 79–97)
MONOCYTES # BLD AUTO: 0.37 10*3/MM3 (ref 0.1–0.9)
MONOCYTES NFR BLD AUTO: 3.4 % (ref 5–12)
NEUTROPHILS NFR BLD AUTO: 79.3 % (ref 42.7–76)
NEUTROPHILS NFR BLD AUTO: 8.7 10*3/MM3 (ref 1.7–7)
NRBC BLD AUTO-RTO: 0 /100 WBC (ref 0–0.2)
PLATELET # BLD AUTO: 337 10*3/MM3 (ref 140–450)
PMV BLD AUTO: 10.9 FL (ref 6–12)
POTASSIUM SERPL-SCNC: 4.1 MMOL/L (ref 3.5–5.2)
PROT SERPL-MCNC: 7.6 G/DL (ref 6–8.5)
RBC # BLD AUTO: 4.87 10*6/MM3 (ref 3.77–5.28)
SODIUM SERPL-SCNC: 141 MMOL/L (ref 136–145)
TSH SERPL DL<=0.05 MIU/L-ACNC: 0.8 UIU/ML (ref 0.27–4.2)
VIT B12 BLD-MCNC: 723 PG/ML (ref 211–946)
WBC NRBC COR # BLD: 10.95 10*3/MM3 (ref 3.4–10.8)

## 2022-03-04 PROCEDURE — 82746 ASSAY OF FOLIC ACID SERUM: CPT

## 2022-03-04 PROCEDURE — 84443 ASSAY THYROID STIM HORMONE: CPT

## 2022-03-04 PROCEDURE — 80053 COMPREHEN METABOLIC PANEL: CPT

## 2022-03-04 PROCEDURE — 82306 VITAMIN D 25 HYDROXY: CPT

## 2022-03-04 PROCEDURE — 36415 COLL VENOUS BLD VENIPUNCTURE: CPT

## 2022-03-04 PROCEDURE — 99214 OFFICE O/P EST MOD 30 MIN: CPT | Performed by: NURSE PRACTITIONER

## 2022-03-04 PROCEDURE — 85025 COMPLETE CBC W/AUTO DIFF WBC: CPT

## 2022-03-04 PROCEDURE — 82607 VITAMIN B-12: CPT

## 2022-03-04 RX ORDER — SUMATRIPTAN 50 MG/1
TABLET, FILM COATED ORAL
Qty: 12 TABLET | Refills: 2 | Status: SHIPPED | OUTPATIENT
Start: 2022-03-04 | End: 2022-10-11 | Stop reason: SDUPTHER

## 2022-03-04 RX ORDER — OMEPRAZOLE 40 MG/1
40 CAPSULE, DELAYED RELEASE ORAL DAILY
COMMUNITY
Start: 2021-12-27

## 2022-03-04 RX ORDER — ACETAZOLAMIDE 125 MG/1
125 TABLET ORAL 2 TIMES DAILY
Qty: 60 TABLET | Refills: 2 | Status: SHIPPED | OUTPATIENT
Start: 2022-03-04 | End: 2022-08-31 | Stop reason: SINTOL

## 2022-03-04 NOTE — PROGRESS NOTES
Subjective:     Patient ID: Dominga Suarez is a 31 y.o. female.    CC:   Chief Complaint   Patient presents with   • Follow-up       HPI:   History of Present Illness   31 y.o. 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 At last appointment on 12/30/20 continued Aimovig, started Diamox and Imitrex. Continue weight loss.      LP 12/22/20 with opening pressure of 31 cm H2O. CSF: Cell count, Glucose, protein, cytology - NCS.      Labs 12/8/20: CBC, CMP - NCS, HA1C 5.6 - advised patient monitor sugar intake.     Stopped Diamox, she is unsure when or why     Had gastric sleeve in April of last year, has lost 72 lbs.     HA frequency is approximately once per week, in the occiput. Denies N/V, + photophobia, mild pulsatile tinnitus.     Has not had an eye exam since December of 2020. Continues to have blurriness in her vision     Having N/T in the left leg from the hip to the foot, and in the left hand.      Problem History:     HA frequency is daily, located in the occiput and behind bilateral eyes, characteristic of dull aching pain. Moderate to severe.   Associated sx of photophobia, sound sensitivity, blurry vision, nausea, vomiting, pulsatile tinnitus (intermittently), worse with bending over.   Abortive: ibuprofen, excedrin - intermittently helpful. Taking them daily, one or the other.      MRI completed and reportedly normal, she will bring me a disc.      Preventatives: Topamax ( not helpful), SSRI's, contraindicated for BB due to dizziness and low BS sx.      Denies constipation, has episodes of dizziness, shaking, and sweating will eat something sweet and feels better. Has a hx of gestational diabetes, is unsure if he she has been tested for DMT2.      Medical Records reviewed:   Patient with Optic nerve edema bilaterally, reported normal MRI. Has not had an LP completed. Feels like her eyes are swollen every morning, vision is blurry. PMH of migraine.     The following portions of the patient's history were  reviewed and updated as appropriate: allergies, current medications, past family history, past medical history, past social history, past surgical history and problem list.    Past Medical History:   Diagnosis Date   • Calcium kidney stones    • Chronic headaches    • Herpes simplex        Past Surgical History:   Procedure Laterality Date   • DILATATION AND CURETTAGE     • GASTRIC SLEEVE LAPAROSCOPIC  04/20/2021   • KIDNEY STONE SURGERY     • TUBAL ABDOMINAL LIGATION     • WRIST SURGERY         Social History     Socioeconomic History   • Marital status: Single   Tobacco Use   • Smoking status: Current Every Day Smoker     Packs/day: 0.50   • Smokeless tobacco: Never Used   Substance and Sexual Activity   • Alcohol use: Never   • Drug use: Never   • Sexual activity: Defer       Family History   Problem Relation Age of Onset   • Breast cancer Maternal Grandmother    • Colon cancer Paternal Grandfather         Objective:  /64   Pulse 66   Temp 98.9 °F (37.2 °C)   Wt 77.7 kg (171 lb 6.4 oz)   SpO2 99%   BMI 31.35 kg/m²     Neurologic Exam     Mental Status   Oriented to person, place, and time.   Follows 3 step commands.   Attention: normal. Concentration: normal.   Speech: speech is normal   Level of consciousness: alert  Knowledge: good and consistent with education.   Able to name object. Able to read. Able to repeat. Able to write. Normal comprehension.     Cranial Nerves     CN II   Visual fields full to confrontation.   Visual acuity: normal  Right visual field deficit: none  Left visual field deficit: none     CN III, IV, VI   Pupils are equal, round, and reactive to light.  Extraocular motions are normal.   Right pupil: Shape: regular. Reactivity: brisk. Consensual response: intact.   Left pupil: Shape: regular. Reactivity: brisk. Consensual response: intact.   Nystagmus: none   Diplopia: none  Ophthalmoparesis: none  Upgaze: normal  Downgaze: normal  Conjugate gaze: present  Vestibulo-ocular  reflex: present    CN V   Facial sensation intact.   Right corneal reflex: normal  Left corneal reflex: normal    CN VII   Right facial weakness: none  Left facial weakness: none    CN VIII   Hearing: intact    CN IX, X   Palate: symmetric  Right gag reflex: normal  Left gag reflex: normal    CN XI   Right sternocleidomastoid strength: normal  Left sternocleidomastoid strength: normal    CN XII   Tongue: not atrophic  Fasciculations: absent  Tongue deviation: none    Motor Exam   Muscle bulk: normal  Overall muscle tone: normal    Strength   Strength 5/5 throughout.     Sensory Exam   Light touch normal.     Gait, Coordination, and Reflexes     Gait  Gait: normal    Coordination   Finger to nose coordination: normal    Tremor   Resting tremor: absent  Intention tremor: absent  Action tremor: absent    Reflexes   Reflexes 2+ except as noted.       Physical Exam  Vitals and nursing note reviewed.   Constitutional:       Appearance: Normal appearance.   HENT:      Head: Normocephalic and atraumatic.   Eyes:      Extraocular Movements: Extraocular movements intact and EOM normal.      Pupils: Pupils are equal, round, and reactive to light.   Skin:     General: Skin is warm and dry.   Neurological:      Mental Status: She is alert and oriented to person, place, and time.      Coordination: Finger-Nose-Finger Test normal.      Gait: Gait is intact.      Deep Tendon Reflexes: Strength normal.   Psychiatric:         Mood and Affect: Mood normal.         Speech: Speech normal.         Assessment/Plan:       Diagnoses and all orders for this visit:    1. Numbness and tingling (Primary)  Assessment & Plan:  Ordered labs and MRI Brain     Orders:  -     CBC & Differential; Future  -     Comprehensive Metabolic Panel; Future  -     Vitamin B12 & Folate; Future  -     TSH; Future  -     Vitamin D 25 Hydroxy; Future    2. IIH (idiopathic intracranial hypertension)  Assessment & Plan:  Ordered MRI Brain     Recommended having eye  exam     Restart Diamox 125 mg PO BID, may increase dosage depending on eye exam results     Continue Aimovig and Imitrex     Continue weight loss efforts    F/U in 8 weeks or sooner if needed     Orders:  -     MRI Brain With & Without Contrast; Future  -     SUMAtriptan (Imitrex) 50 MG tablet; Take one tablet at onset of headache. May repeat dose one time in 2 hours if headache not relieved.  Dispense: 12 tablet; Refill: 2  -     acetaZOLAMIDE (DIAMOX) 125 MG tablet; Take 1 tablet by mouth 2 (Two) Times a Day.  Dispense: 60 tablet; Refill: 2           Reviewed medications, potential side effects and signs and symptoms to report. Discussed risk versus benefits of treatment plan with patient and/or family-including medications, labs and radiology that may be ordered. Addressed questions and concerns during visit. Patient and/or family verbalized understanding and agree with plan. Patient instructed to call the office with questions or concerns and report to ED with life-threatening symptoms.     AS THE PROVIDER, I PERSONALLY WORE PPE DURING ENTIRE FACE TO FACE ENCOUNTER IN CLINIC WITH THE PATIENT. PATIENT ALSO WORE PPE DURING ENTIRE FACE TO FACE ENCOUNTER EXCEPT FOR A MAX OF 30 SECONDS DURING NEUROLOGICAL EVALUATION OF CRANIAL NERVES AND THEN MASK WAS PLACED BACK OVER PATIENT FACE FOR REMAINDER OF VISIT. I WASHED MY HANDS BEFORE AND AFTER VISIT.    During this visit the following were done:  Labs Reviewed []    Labs Ordered [x]    Radiology Reports Reviewed []    Radiology Ordered [x]    PCP Records Reviewed []    Referring Provider Records Reviewed []    ER Records Reviewed []    Hospital Records Reviewed []    History Obtained From Family []    Radiology Images Reviewed []    Other Reviewed []    Records Requested []      Return in about 8 weeks (around 4/29/2022).      Mary Jane Diaz, KATHY  3/4/2022

## 2022-03-04 NOTE — ASSESSMENT & PLAN NOTE
Ordered MRI Brain     Recommended having eye exam     Restart Diamox 125 mg PO BID, may increase dosage depending on eye exam results     Continue Aimovig and Imitrex     Continue weight loss efforts    F/U in 8 weeks or sooner if needed

## 2022-03-07 ENCOUNTER — TELEPHONE (OUTPATIENT)
Dept: NEUROLOGY | Facility: CLINIC | Age: 32
End: 2022-03-07

## 2022-03-07 NOTE — TELEPHONE ENCOUNTER
I am not sure exactly what is causing her elevated white count, it is barely elevated above normal and is stable in comparison to her previous labs. These levels can be the result of stress or infection. I will forward her labs to her PCP and I recommend she discuss with them.

## 2022-03-07 NOTE — TELEPHONE ENCOUNTER
Notified Dominga who states understanding and sent results on to her PCP, Miguel Angel Seo through medical release tab/right fax. Thanks!

## 2022-03-07 NOTE — TELEPHONE ENCOUNTER
----- Message from KATHY Soler sent at 3/7/2022 10:42 AM EST -----  Please let Dominga know that her blood work is stable except her Vitamin D is a little low. I recommend she start taking OTC Vitamin D 2000 units per day.   Thanks!

## 2022-03-07 NOTE — TELEPHONE ENCOUNTER
Relayed results to Dominga who states understanding. Dominga mentioned seeing that her white blood cell count is elevated and that her WBC is always elevated and would like to know your thoughts on this?

## 2022-03-18 ENCOUNTER — SPECIALTY PHARMACY (OUTPATIENT)
Dept: ONCOLOGY | Facility: HOSPITAL | Age: 32
End: 2022-03-18

## 2022-03-18 NOTE — PROGRESS NOTES
Specialty Pharmacy Refill Coordination Note     Dominga is a 31 y.o. female contacted today regarding refills of  Aimovig specialty medication(s). Patient is due for injection on 3/29.    Reviewed and verified with patient:         Specialty medication(s) and dose(s) confirmed: yes    Refill Questions    Flowsheet Row Most Recent Value   Changes to allergies? No   Changes to medications? No   New conditions since last clinic visit No   Unplanned office visit, urgent care, ED, or hospital admission in the last 4 weeks  No   How does patient/caregiver feel medication is working? Excellent   Financial problems or insurance changes  No   If yes, describe changes in insurance or financial issues. N/A   How many doses of your specialty medications were missed in the last 4 weeks? 0   Why were doses missed? N/A          Delivery Questions    Flowsheet Row Most Recent Value   Delivery method FedEx   Delivery address correct? Yes   Preferred delivery time? Anytime   Number of medications in delivery 1   Medication being filled and delivered Aimovig   Doses left of specialty medications 0   Is there any medication that is due not being filled? No   Supplies needed? No supplies needed   Cooler needed? Yes   Do any medications need mixed or dated? No   Copay form of payment Payment plan already set up   Questions or concerns for the pharmacist? No   Are any medications first time fills? No            Medication Adherence    Adherence tools used: calendar  Support network for adherence: healthcare provider   Other support network: Pharmacy           Follow-up: 28 day(s)     Lisa Root, Pharmacy Technician  Specialty Pharmacy Technician

## 2022-04-01 ENCOUNTER — TELEPHONE (OUTPATIENT)
Dept: NEUROLOGY | Facility: CLINIC | Age: 32
End: 2022-04-01

## 2022-04-01 ENCOUNTER — HOSPITAL ENCOUNTER (OUTPATIENT)
Dept: MRI IMAGING | Facility: HOSPITAL | Age: 32
Discharge: HOME | End: 2022-04-01
Admitting: NURSE PRACTITIONER

## 2022-04-01 DIAGNOSIS — G93.2 IIH (IDIOPATHIC INTRACRANIAL HYPERTENSION): Chronic | ICD-10-CM

## 2022-04-01 PROCEDURE — 0 GADOBENATE DIMEGLUMINE 529 MG/ML SOLUTION: Performed by: NURSE PRACTITIONER

## 2022-04-01 PROCEDURE — 70553 MRI BRAIN STEM W/O & W/DYE: CPT

## 2022-04-01 PROCEDURE — A9577 INJ MULTIHANCE: HCPCS | Performed by: NURSE PRACTITIONER

## 2022-04-01 RX ADMIN — GADOBENATE DIMEGLUMINE 15 ML: 529 INJECTION, SOLUTION INTRAVENOUS at 08:20

## 2022-04-01 NOTE — TELEPHONE ENCOUNTER
Called patient with results.  Patient was understanding.    ----- Message from Boston Anguiano DNP, APRN sent at 4/1/2022  9:53 AM EDT -----  Please notify pt her MRI is consistent with the diagnosis of IIH and there are not other concerning findings.      
right rail up
right rail up

## 2022-04-20 ENCOUNTER — SPECIALTY PHARMACY (OUTPATIENT)
Dept: ONCOLOGY | Facility: HOSPITAL | Age: 32
End: 2022-04-20

## 2022-04-20 NOTE — PROGRESS NOTES
Specialty Pharmacy Refill Coordination Note     Dominga is a 32 y.o. female contacted today regarding refills of  Aimovig specialty medication(s). Patient is due for injection on 4/26.      Reviewed and verified with patient:         Specialty medication(s) and dose(s) confirmed: yes    Refill Questions    Flowsheet Row Most Recent Value   Changes to allergies? No   Changes to medications? No   New conditions since last clinic visit No   Unplanned office visit, urgent care, ED, or hospital admission in the last 4 weeks  No   How does patient/caregiver feel medication is working? Excellent   Financial problems or insurance changes  No   If yes, describe changes in insurance or financial issues. N/A   How many doses of your specialty medications were missed in the last 4 weeks? 0   Why were doses missed? N/A   Does this patient require a clinical escalation to a pharmacist? No          Delivery Questions    Flowsheet Row Most Recent Value   Delivery method FedEx   Delivery address correct? Yes   Preferred delivery time? Anytime   Number of medications in delivery 1   Medication being filled and delivered Aimovig   Doses left of specialty medications 0   Is there any medication that is due not being filled? No   Supplies needed? No supplies needed   Cooler needed? Yes   Do any medications need mixed or dated? No   Copay form of payment Payment plan already set up   Questions or concerns for the pharmacist? No   Are any medications first time fills? No            Medication Adherence    Adherence tools used: calendar  Support network for adherence: healthcare provider   Other support network: Pharmacy           Follow-up: 28 day(s)     Lisa Root, Pharmacy Technician  Specialty Pharmacy Technician

## 2022-05-17 ENCOUNTER — SPECIALTY PHARMACY (OUTPATIENT)
Dept: ONCOLOGY | Facility: HOSPITAL | Age: 32
End: 2022-05-17

## 2022-05-17 NOTE — PROGRESS NOTES
Specialty Pharmacy Refill Coordination Note     Dominga is a 32 y.o. female contacted today regarding refills of  Aimovig specialty medication(s). Patient is due for injection on 5/24.      Reviewed and verified with patient:         Specialty medication(s) and dose(s) confirmed: yes    Refill Questions    Flowsheet Row Most Recent Value   Changes to allergies? No   Changes to medications? No   New conditions since last clinic visit No   Unplanned office visit, urgent care, ED, or hospital admission in the last 4 weeks  No   How does patient/caregiver feel medication is working? Excellent   Financial problems or insurance changes  No   If yes, describe changes in insurance or financial issues. N/A   Since the previous refill, were any specialty medication doses or scheduled injections missed or delayed?  No   If yes, please provide the amount N/A   Why were doses missed? N/A   Does this patient require a clinical escalation to a pharmacist? No          Delivery Questions    Flowsheet Row Most Recent Value   Delivery method FedEx   Delivery address correct? Yes   Preferred delivery time? Anytime   Number of medications in delivery 1   Medication being filled and delivered Aimovig   Doses left of specialty medications 0   Is there any medication that is due not being filled? No   Supplies needed? No supplies needed   Cooler needed? Yes   Do any medications need mixed or dated? No   Copay form of payment Payment plan already set up   Questions or concerns for the pharmacist? No   Are any medications first time fills? No            Medication Adherence    Adherence tools used: calendar  Support network for adherence: healthcare provider   Other support network: Pharmacy           Follow-up: 28 day(s)     Lisa Root, Pharmacy Technician  Specialty Pharmacy Technician

## 2022-06-08 ENCOUNTER — SPECIALTY PHARMACY (OUTPATIENT)
Dept: ONCOLOGY | Facility: HOSPITAL | Age: 32
End: 2022-06-08

## 2022-06-08 NOTE — PROGRESS NOTES
Specialty Pharmacy Refill Coordination Note     Dominga is a 32 y.o. female contacted today regarding refills of  Aimovig specialty medication(s). Patient is due for injection on 6/21.      Reviewed and verified with patient:         Specialty medication(s) and dose(s) confirmed: yes    Refill Questions    Flowsheet Row Most Recent Value   Changes to allergies? No   Changes to medications? No   New conditions since last clinic visit No   Unplanned office visit, urgent care, ED, or hospital admission in the last 4 weeks  No   How does patient/caregiver feel medication is working? Excellent   Financial problems or insurance changes  No   If yes, describe changes in insurance or financial issues. N/A   Since the previous refill, were any specialty medication doses or scheduled injections missed or delayed?  No   If yes, please provide the amount N/A   Why were doses missed? N/A   Does this patient require a clinical escalation to a pharmacist? No          Delivery Questions    Flowsheet Row Most Recent Value   Delivery method FedEx   Delivery address correct? Yes   Preferred delivery time? Anytime   Number of medications in delivery 1   Medication being filled and delivered Aimovig   Doses left of specialty medications 0   Is there any medication that is due not being filled? No   Supplies needed? No supplies needed   Cooler needed? Yes   Do any medications need mixed or dated? No   Copay form of payment Payment plan already set up   Questions or concerns for the pharmacist? No   Are any medications first time fills? No            Medication Adherence    Adherence tools used: calendar  Support network for adherence: healthcare provider   Other support network: Pharmacy           Follow-up: 28 day(s)     Lisa Root, Pharmacy Technician  Specialty Pharmacy Technician

## 2022-07-11 ENCOUNTER — SPECIALTY PHARMACY (OUTPATIENT)
Dept: ONCOLOGY | Facility: HOSPITAL | Age: 32
End: 2022-07-11

## 2022-07-11 NOTE — PROGRESS NOTES
Specialty Pharmacy Refill Coordination Note     Dominga is a 32 y.o. female contacted today regarding refills of Aimovig specialty medication(s). Patient's last injection of Aimovig was given on 6/21/2022. Patient's next injection of Aimovig due 7/19/2022.    Reviewed and verified with patient: Yes  Specialty medication(s) and dose(s) confirmed: yes    Refill Questions    Flowsheet Row Most Recent Value   Changes to allergies? No   Changes to medications? No   New conditions since last clinic visit No   Unplanned office visit, urgent care, ED, or hospital admission in the last 4 weeks  No   How does patient/caregiver feel medication is working? Excellent   Financial problems or insurance changes  No   If yes, describe changes in insurance or financial issues. N/A   Since the previous refill, were any specialty medication doses or scheduled injections missed or delayed?  No   If yes, please provide the amount N/A   Why were doses missed? N/A   Does this patient require a clinical escalation to a pharmacist? No          Delivery Questions    Flowsheet Row Most Recent Value   Delivery method [employer]Ex  [MEDICAID SIGNATURE REQUIRED. Ship Friday 7/15/2022. Delivery Saturday 7/16/2022.]   Delivery address correct? No   Preferred delivery time? Anytime   Number of medications in delivery 1   Medication being filled and delivered Aimovig   Doses left of specialty medications None. Once monthly injection.   Is there any medication that is due not being filled? No   Supplies needed? No supplies needed   Cooler needed? Yes   Do any medications need mixed or dated? No   Copay form of payment Credit card on file   Questions or concerns for the pharmacist? No   Are any medications first time fills? No            Medication Adherence    Adherence tools used: calendar  Support network for adherence: healthcare provider   Other support network: Pharmacy           Follow-up:  28 days.     Janel Doll, Pharmacy Technician  Specialty  Pharmacy Technician

## 2022-08-09 ENCOUNTER — SPECIALTY PHARMACY (OUTPATIENT)
Dept: ONCOLOGY | Facility: HOSPITAL | Age: 32
End: 2022-08-09

## 2022-08-12 ENCOUNTER — SPECIALTY PHARMACY (OUTPATIENT)
Dept: ONCOLOGY | Facility: HOSPITAL | Age: 32
End: 2022-08-12

## 2022-08-12 NOTE — PROGRESS NOTES
Specialty Pharmacy Patient Management Program  Neurology Reassessment     Dominga Suarez is a 32 y.o. female with migraines seen by a Neurology provider and enrolled in the Neurology Patient Management program offered by [facility].  A follow-up outreach was conducted, including assessment of continued therapy appropriateness, medication adherence, and side effect incidence and management for  Aimovig.     Changes to Insurance Coverage or Financial Support  [place] Medicaid     Relevant Past Medical History and Comorbidities  Relevant medical history and concomitant health conditions were discussed with the patient. The patient's chart has been reviewed for relevant past medical history and comorbid health conditions and updated as necessary.   Past Medical History:   Diagnosis Date   • Calcium kidney stones    • Chronic headaches    • Herpes simplex      Social History     Socioeconomic History   • Marital status: Single   Tobacco Use   • Smoking status: Current Every Day Smoker     Packs/day: 0.50   • Smokeless tobacco: Never Used   Substance and Sexual Activity   • Alcohol use: Never   • Drug use: Never   • Sexual activity: Defer       Problem list reviewed by Romina Helm, PharmD on 8/12/2022 at 11:26 AM    Allergies  Known allergies and reactions were discussed with the patient. The patient's chart has been reviewed for allergy information and updated as necessary.   Patient has no known allergies.    Allergies reviewed by Romina Helm, PharmD on 8/12/2022 at 11:26 AM    Relevant Laboratory Values    Common labs    Common Labsle 12/20/21 12/20/21 3/4/22 3/4/22    1318 1318 1037 1037   Glucose    89   Glucose  87     BUN  9  13   Creatinine  0.67  0.95   eGFR Non  Am  >60     eGFR African Am  >60     Sodium  141  141   Potassium  4.2  4.1   Chloride  104  103   Calcium  9.3  10.0   Albumin  4.2  4.70   Total Bilirubin  0.4  0.3   Alkaline Phosphatase  94  89   AST (SGOT)   11  15   ALT (SGPT)  10  14   WBC 14.95 (A)  10.95 (A)    Hemoglobin 12.6  14.6    Hematocrit 39.3  43.8    Platelets 317  337    (A) Abnormal value       Comments are available for some flowsheets but are not being displayed.               Current Medication List  This medication list has been reviewed with the patient and evaluated for any interactions or necessary modifications/recommendations, and updated to include all prescription medications, OTC medications, and supplements the patient is currently taking.  This list reflects what is contained in the patient's profile, which has also been marked as reviewed to communicate to other providers it is the most up to date version of the patient's current medication therapy.     Current Outpatient Medications:   •  acetaZOLAMIDE (DIAMOX) 125 MG tablet, Take 1 tablet by mouth 2 (Two) Times a Day., Disp: 60 tablet, Rfl: 2  •  acyclovir (ZOVIRAX) 400 MG tablet, Take 400 mg by mouth 2 (two) times a day., Disp: , Rfl:   •  Erenumab-aooe (AIMOVIG) 140 MG/ML prefilled syringe, Inject 1 mL under the skin into the appropriate area as directed Every 28 (Twenty-Eight) Days., Disp: 1 mL, Rfl: 11  •  omeprazole (priLOSEC) 40 MG capsule, Take 40 mg by mouth Daily., Disp: , Rfl:   •  SUMAtriptan (Imitrex) 50 MG tablet, Take one tablet at onset of headache. May repeat dose one time in 2 hours if headache not relieved., Disp: 12 tablet, Rfl: 2    Medicines reviewed by Romina Helm, PharmD on 8/12/2022 at 11:27 AM    Drug Interactions  none     Adverse Drug Reactions  • Adverse Reactions Experienced: none   • Plan for ADR Management: not requred    Hospitalizations and Urgent Care Since Last Assessment  • Hospitalizations or Admissions: none   • ED Visits: none  • Urgent Office Visits: none     Adherence and Self-Administration  Adherence related patient's specialty therapy was discussed with the patient. The Adherence segment of this outreach has been reviewed and updated.      Medication Adherence    Adherence tools used: calendar  Support network for adherence: healthcare provider   Other support networks: Pharmacy         • Ongoing or New Barriers to Patient Adherence and/or Self-Administration: none   • Methods for Supporting Patient Adherence and/or Self-Administration: Patient reports no issues with Aimovig self-injection.     Goals of Therapy  Goals related to the patient's specialty therapy was discussed with the patient. The Patient Goals segment of this outreach has been reviewed and updated.    Goals     • Specialty Pharmacy General Goal      Reduction in severity and frequency of migraines             Quality of Life Assessment   Quality of Life related to the patient's specialty therapy was discussed with the patient. The QOL segment of this outreach has been reviewed and updated.     Quality of Life Assessment  Quality of Life Improvement Scale: Somewhat better    Reassessment Plan & Follow-Up  1. Medication Therapy Changes: none  2. Additional Plans, Therapy Recommendations, or Therapy Problems to Be Addressed: none  3. Pharmacist to perform regular reassessments no more than (6) months from the previous assessment.  4. Care Coordinator to set up future refill outreaches, coordinate prescription delivery, and escalate clinical questions to pharmacist.     Attestation  I attest that the specialty medication(s) addressed above are appropriate for the patient based on my reassessment.  If the prescribed therapy is at any point deemed not appropriate based on the current or future assessments, a consultation will be initiated with the patient's specialty care provider to determine the best course of action. The revised plan of therapy will be documented along with any additional patient education provided.     Romina Helm, CynD  8/12/2022  11:49 EDT

## 2022-08-31 ENCOUNTER — OFFICE VISIT (OUTPATIENT)
Dept: NEUROLOGY | Facility: CLINIC | Age: 32
End: 2022-08-31

## 2022-08-31 VITALS
BODY MASS INDEX: 28.89 KG/M2 | HEIGHT: 62 IN | SYSTOLIC BLOOD PRESSURE: 114 MMHG | DIASTOLIC BLOOD PRESSURE: 68 MMHG | TEMPERATURE: 96.9 F | WEIGHT: 157 LBS | HEART RATE: 73 BPM | OXYGEN SATURATION: 98 %

## 2022-08-31 DIAGNOSIS — G93.2 IIH (IDIOPATHIC INTRACRANIAL HYPERTENSION): Primary | ICD-10-CM

## 2022-08-31 DIAGNOSIS — Z71.6 ENCOUNTER FOR SMOKING CESSATION COUNSELING: ICD-10-CM

## 2022-08-31 DIAGNOSIS — G43.709 CHRONIC MIGRAINE WITHOUT AURA WITHOUT STATUS MIGRAINOSUS, NOT INTRACTABLE: ICD-10-CM

## 2022-08-31 PROCEDURE — 99214 OFFICE O/P EST MOD 30 MIN: CPT | Performed by: NURSE PRACTITIONER

## 2022-08-31 RX ORDER — TAMSULOSIN HYDROCHLORIDE 0.4 MG/1
CAPSULE ORAL
COMMUNITY
Start: 2022-08-28 | End: 2022-10-11

## 2022-08-31 RX ORDER — POLYETHYLENE GLYCOL 3350 17 G
2 POWDER IN PACKET (EA) ORAL AS NEEDED
Qty: 24 LOZENGE | Refills: 1 | Status: SHIPPED | OUTPATIENT
Start: 2022-08-31 | End: 2022-10-11

## 2022-08-31 RX ORDER — ONDANSETRON 8 MG/1
TABLET, ORALLY DISINTEGRATING ORAL
COMMUNITY
Start: 2022-08-28 | End: 2022-10-11 | Stop reason: SDUPTHER

## 2022-08-31 RX ORDER — AMITRIPTYLINE HYDROCHLORIDE 25 MG/1
25 TABLET, FILM COATED ORAL NIGHTLY
Qty: 30 TABLET | Refills: 5 | Status: SHIPPED | OUTPATIENT
Start: 2022-08-31 | End: 2022-10-11

## 2022-08-31 NOTE — PROGRESS NOTES
Neuro Office Visit      Encounter Date: 2022   Patient Name: Dominga Suarez  : 1990   MRN: [id number]   PCP: Dr Seo  Chief Complaint:    No chief complaint on file.      History of Present Illness: Dominga Suarez is a 32 y.o. female who is here today in Neurology for migraine and IIH.    Last visit 3/4/22 w KATHY Vasquez-labs, MRi brain, eye exam, restart Diamox, cont Aimovig and Imtirex, encourage weight loss  MRI Brain With & Without Contrast (2022 08:19)-IMPRESSION:  Partially empty and mildly expanded sella, in addition to symmetric  transverse sinus narrowing. While somewhat nonspecific, given elevated  opening pressure on prior lumbar puncture, these findings can be seen  with idiopathic intracranial hypertension. Otherwise normal  contrast-enhanced MRI of the brain.    Labs:CMP, TSH, folate, vit b12, vit D, CBC-stable      IIH  Aimovig is effective. It wears off last week before it's due. Has HA daily last week and 2 more during the month.  Sumatriptan is effective in 1 hour.  Last eye exam 18 months ago. Blurred vision only w migraines.  Only took it a few times. Diamox makes her feel foggy.  Last dose 6 months ago.    Has lost 89 pounds after gastric sleeve    Headache Symptoms:   Days per month: 7  Location:top of head, temples, occipital     Quality: Aching and pressure     Duration: hours to 1 day  Severity: 6/10  Triggers: none  Associated Symptoms: Nausea, Vomiting, Photophobia, Phonophobia,  Vision changes blurred and Tinnitus  Aura: none    Abortives: IBU, excedrin  Preventives: TPM, SSRI, BB contraindicated due to dizziness and low blood sugar             PH  Optic nerve edema on exam  Opening pressure 31 cm H20  MRI nml per pt report    Paresthesia  Being evaluated by PCP.    Smoking cessation  Smoking and vaping. Would like to stop. Has not tried any treatments.      PMH: Gastric sleeve 2021, renal stones  Subjective      Past Medical History:   Past  Medical History:   Diagnosis Date   • Calcium kidney stones    • Chronic headaches    • Headache, tension-type    • Herpes simplex    • Migraine        Past Surgical History:   Past Surgical History:   Procedure Laterality Date   • DILATATION AND CURETTAGE     • GASTRIC SLEEVE LAPAROSCOPIC  04/20/2021   • KIDNEY STONE SURGERY     • TUBAL ABDOMINAL LIGATION     • WRIST SURGERY         Family History:   Family History   Problem Relation Age of Onset   • Breast cancer Maternal Grandmother    • Migraines Maternal Grandmother    • Colon cancer Paternal Grandfather        Social History:   Social History     Socioeconomic History   • Marital status: Single   Tobacco Use   • Smoking status: Current Every Day Smoker     Packs/day: 0.50     Years: 15.00     Pack years: 7.50     Types: Cigarettes, Electronic Cigarette   • Smokeless tobacco: Never Used   Vaping Use   • Vaping Use: Never used   Substance and Sexual Activity   • Alcohol use: Never   • Drug use: Not Currently   • Sexual activity: Yes     Partners: Male     Birth control/protection: Condom, Tubal ligation       Medications:     Current Outpatient Medications:   •  acyclovir (ZOVIRAX) 400 MG tablet, Take 400 mg by mouth 2 (two) times a day., Disp: , Rfl:   •  Erenumab-aooe (AIMOVIG) 140 MG/ML prefilled syringe, Inject 1 mL under the skin into the appropriate area as directed Every 28 (Twenty-Eight) Days., Disp: 1 mL, Rfl: 11  •  omeprazole (priLOSEC) 40 MG capsule, Take 40 mg by mouth Daily., Disp: , Rfl:   •  ondansetron ODT (ZOFRAN-ODT) 8 MG disintegrating tablet, TAKE 1 TABLET BY MOUTH 3 TIMES A DAY FOR 3 DAYS, Disp: , Rfl:   •  SUMAtriptan (Imitrex) 50 MG tablet, Take one tablet at onset of headache. May repeat dose one time in 2 hours if headache not relieved., Disp: 12 tablet, Rfl: 2  •  tamsulosin (FLOMAX) 0.4 MG capsule 24 hr capsule, TAKE 1 CAPSULE BY MOUTH EVERY DAY FOR 7 DAYS, Disp: , Rfl:   •  amitriptyline (ELAVIL) 25 MG tablet, Take 1 tablet by mouth  Every Night., Disp: 30 tablet, Rfl: 5  •  nicotine polacrilex (COMMIT) 2 MG lozenge, Dissolve 1 lozenge in the mouth As Needed for Smoking Cessation., Disp: 24 lozenge, Rfl: 1  •  varenicline (CHANTIX KEYON) 0.5 MG X 11 & 1 MG X 42 tablet, Take 0.5 mg po daily x 3 days, then 0.5 mg po bid x 4 days, then 1 mg po bid, Disp: 53 tablet, Rfl: 0    Allergies:   No Known Allergies    PHQ-9 Total Score:     STEADI Fall Risk Assessment has not been completed.    Objective     Physical Exam:   Physical Exam  Eyes:      Pupils: Pupils are equal, round, and reactive to light.   Neurological:      Mental Status: She is oriented to person, place, and time.      Gait: Gait is intact.      Deep Tendon Reflexes:      Reflex Scores:       Tricep reflexes are 2+ on the right side and 2+ on the left side.       Bicep reflexes are 2+ on the right side and 2+ on the left side.       Brachioradialis reflexes are 2+ on the right side and 2+ on the left side.       Patellar reflexes are 2+ on the right side and 2+ on the left side.       Achilles reflexes are 2+ on the right side and 2+ on the left side.  Psychiatric:         Speech: Speech normal.         Neurologic Exam     Mental Status   Oriented to person, place, and time.   Follows 3 step commands.   Attention: normal. Concentration: normal.   Speech: speech is normal   Level of consciousness: alert  Knowledge: consistent with education.   Normal comprehension.     Cranial Nerves     CN III, IV, VI   Pupils are equal, round, and reactive to light.  Right pupil: Accommodation: intact.   Left pupil: Accommodation: intact.   CN III: no CN III palsy  CN VI: no CN VI palsy  Nystagmus: none   Diplopia: none  Upgaze: normal  Downgaze: normal  Conjugate gaze: present    CN VII   Facial expression full, symmetric.     CN VIII   Hearing: intact    CN XII   CN XII normal.     Motor Exam   Muscle bulk: normal  Overall muscle tone: normal    Strength   Right biceps: 5/5  Left biceps: 5/5  Right  "triceps: 5/5  Left triceps: 5/5  Right interossei: 5/5  Left interossei: 5/5  Right quadriceps: 5/5  Left quadriceps: 5/5  Right anterior tibial: 5/5  Left anterior tibial: 5/5  Right posterior tibial: 5/5  Left posterior tibial: 5/5    Sensory Exam   Light touch normal.     Gait, Coordination, and Reflexes     Gait  Gait: normal    Tremor   Resting tremor: absent  Action tremor: absent    Reflexes   Right brachioradialis: 2+  Left brachioradialis: 2+  Right biceps: 2+  Left biceps: 2+  Right triceps: 2+  Left triceps: 2+  Right patellar: 2+  Left patellar: 2+  Right achilles: 2+  Left achilles: 2+  Right : 2+  Left : 2+       Vital Signs:   Vitals:    08/31/22 0831   BP: 114/68   Pulse: 73   Temp: 96.9 °F (36.1 °C)   SpO2: 98%   Weight: 71.2 kg (157 lb)   Height: 157.5 cm (62\")     Body mass index is 28.72 kg/m².     Results:   Imaging:   No Images in the past 120 days found..         Assessment / Plan      Assessment/Plan:   Diagnoses and all orders for this visit:    1. IIH (idiopathic intracranial hypertension) (Primary)  Comments:  Repeat LP after weight loss. Need eye exam for papilledema. Asked pt to send report.  Orders:  -     IR Lumbar Puncture Diagnosis; Future    2. Chronic migraine without aura without status migrainosus, not intractable  Comments:  Cont Emgality and imitrex. Add elavil  Orders:  -     amitriptyline (ELAVIL) 25 MG tablet; Take 1 tablet by mouth Every Night.  Dispense: 30 tablet; Refill: 5    3. Encounter for smoking cessation counseling  Comments:  Lozenges and Chantix  Orders:  -     varenicline (CHANTIX KEYON) 0.5 MG X 11 & 1 MG X 42 tablet; Take 0.5 mg po daily x 3 days, then 0.5 mg po bid x 4 days, then 1 mg po bid  Dispense: 53 tablet; Refill: 0  -     nicotine polacrilex (COMMIT) 2 MG lozenge; Dissolve 1 lozenge in the mouth As Needed for Smoking Cessation.  Dispense: 24 lozenge; Refill: 1           Patient Education:       Reviewed medications, potential side effects and " signs and symptoms to report. Discussed risk versus benefits of treatment plan with patient and/or family-including medications, labs and radiology that may be ordered. Addressed questions and concerns during visit. Patient and/or family verbalized understanding and agree with plan. Instructed to call the office with any questions and report to ER with any life-threatening symptoms.     Follow Up:   Return in about 3 months (around 11/30/2022) for Recheck.    During this visit the following were done:  Labs Reviewed []    Labs Ordered []    Radiology Reports Reviewed []    Radiology Ordered []    PCP Records Reviewed []    Referring Provider Records Reviewed []    ER Records Reviewed []    Hospital Records Reviewed []    History Obtained From Family []    Radiology Images Reviewed []    Other Reviewed [x]    Records Requested []      [place] Anguiano, ISABELLA, APRN

## 2022-09-02 ENCOUNTER — SPECIALTY PHARMACY (OUTPATIENT)
Dept: PHARMACY | Facility: HOSPITAL | Age: 32
End: 2022-09-02

## 2022-09-02 NOTE — PROGRESS NOTES
Specialty Pharmacy Refill Coordination Note     Dominga is a 32 y.o. female contacted today regarding refills of  Aimovig specialty medication(s). Patient is due for injection on 9/13.      Reviewed and verified with patient:       Specialty medication(s) and dose(s) confirmed: yes    Refill Questions    Flowsheet Row Most Recent Value   Changes to allergies? No   Changes to medications? No   New conditions since last clinic visit No   Unplanned office visit, urgent care, ED, or hospital admission in the last 4 weeks  No   How does patient/caregiver feel medication is working? Very good   Financial problems or insurance changes  No   If yes, describe changes in insurance or financial issues. N/A   Since the previous refill, were any specialty medication doses or scheduled injections missed or delayed?  No   If yes, please provide the amount N/A   Why were doses missed? N/A   Does this patient require a clinical escalation to a pharmacist? No          Delivery Questions    Flowsheet Row Most Recent Value   Delivery method [employer]Ex   Delivery address correct? Yes   Preferred delivery time? Anytime   Number of medications in delivery 1   Medication being filled and delivered Aimovig   Doses left of specialty medications 0   Is there any medication that is due not being filled? No   Supplies needed? No supplies needed   Cooler needed? Yes   Do any medications need mixed or dated? No   Copay form of payment Payment plan already set up   Questions or concerns for the pharmacist? No   Are any medications first time fills? No            Medication Adherence    Adherence tools used: calendar  Support network for adherence: healthcare provider   Other support network: Pharmacy           Follow-up: 28 day(s)     Lisa Root, Pharmacy Technician  Specialty Pharmacy Technician

## 2022-09-06 ENCOUNTER — HOSPITAL ENCOUNTER (OUTPATIENT)
Dept: GENERAL RADIOLOGY | Facility: HOSPITAL | Age: 32
Discharge: HOME | End: 2022-09-06
Admitting: NURSE PRACTITIONER

## 2022-09-06 VITALS
HEIGHT: 62 IN | TEMPERATURE: 97.2 F | DIASTOLIC BLOOD PRESSURE: 51 MMHG | BODY MASS INDEX: 29.04 KG/M2 | OXYGEN SATURATION: 99 % | WEIGHT: 157.8 LBS | SYSTOLIC BLOOD PRESSURE: 96 MMHG | RESPIRATION RATE: 14 BRPM | HEART RATE: 87 BPM

## 2022-09-06 DIAGNOSIS — G93.2 IIH (IDIOPATHIC INTRACRANIAL HYPERTENSION): ICD-10-CM

## 2022-09-06 LAB
APPEARANCE CSF: CLEAR
APPEARANCE CSF: CLEAR
COLOR CSF: COLORLESS
COLOR CSF: COLORLESS
COLOR SPUN CSF: COLORLESS
COLOR SPUN CSF: COLORLESS
GLUCOSE CSF-MCNC: 56 MG/DL (ref 40–70)
PROT CSF-MCNC: 18.1 MG/DL (ref 15–45)
RBC # CSF MANUAL: 1 /MM3 (ref 0–5)
RBC # CSF MANUAL: 2 /MM3 (ref 0–5)
SPECIMEN VOL CSF: 8.5 ML
SPECIMEN VOL CSF: 8.5 ML
TUBE # CSF: 1
TUBE # CSF: 4
WBC # CSF MANUAL: 2 /MM3 (ref 0–5)
WBC # CSF MANUAL: 3 /MM3 (ref 0–5)

## 2022-09-06 PROCEDURE — 82945 GLUCOSE OTHER FLUID: CPT | Performed by: NURSE PRACTITIONER

## 2022-09-06 PROCEDURE — 84157 ASSAY OF PROTEIN OTHER: CPT | Performed by: NURSE PRACTITIONER

## 2022-09-06 PROCEDURE — 89050 BODY FLUID CELL COUNT: CPT | Performed by: NURSE PRACTITIONER

## 2022-09-06 RX ORDER — LIDOCAINE HYDROCHLORIDE 10 MG/ML
5 INJECTION, SOLUTION EPIDURAL; INFILTRATION; INTRACAUDAL; PERINEURAL ONCE
Status: COMPLETED | OUTPATIENT
Start: 2022-09-06 | End: 2022-09-06

## 2022-09-06 RX ADMIN — LIDOCAINE HYDROCHLORIDE 5 ML: 10 INJECTION, SOLUTION EPIDURAL; INFILTRATION; INTRACAUDAL; PERINEURAL at 10:15

## 2022-09-06 NOTE — POST-PROCEDURE NOTE
Radiology Procedure    Pre-procedure: procedure, risks discussed with patient. Patient indicated understanding and consented to procedure.     Procedure Performed: lumbar puncture     IV Sedation and/or Anesthesia:  No    Complications: none    Preliminary Findings: opening pressure 22cm H2O, closing pressure 14cm H2O    Specimen Removed: 8cc clear, colorless CSF    Estimated Blood Loss:  0ml    Post-Procedure Diagnosis: pending    Post-Procedure Plan: encourage fluids, bed rest x 2 hours    Standard Discharge Instructions Given:yes     CARROLL Lemon  09/06/22  10:07 EDT

## 2022-09-07 ENCOUNTER — TELEPHONE (OUTPATIENT)
Dept: INFUSION THERAPY | Facility: HOSPITAL | Age: 32
End: 2022-09-07

## 2022-09-21 ENCOUNTER — TELEPHONE (OUTPATIENT)
Dept: NEUROLOGY | Facility: CLINIC | Age: 32
End: 2022-09-21

## 2022-09-21 NOTE — TELEPHONE ENCOUNTER
Called patient and gave provider's note.  Patient stated she is down to 155 lbs.  And does not intend to lose any more weight.    Her pressure on spinal tap was mildly elevated and eye exam with mild swelling of discs     Recommend continued wt loss.     ----- Message from Linda Thomason sent at 9/21/2022  1:36 PM EDT -----  Regarding: VISION RESULTS  I called to reschedule Dominga's appointment.  She wanted a sooner appointment due to pressure behind her eyes.  She wants to hear about her results from vision appointment.  She was under the impression that Ignacio would be reaching out to her after she read the report.  Please advise.

## 2022-09-30 ENCOUNTER — SPECIALTY PHARMACY (OUTPATIENT)
Dept: ONCOLOGY | Facility: HOSPITAL | Age: 32
End: 2022-09-30

## 2022-09-30 NOTE — PROGRESS NOTES
Specialty Pharmacy Refill Coordination Note     Dominga is a 32 y.o. female contacted today regarding refills of Aimovig specialty medication(s). Patient's last injection of Aimovig was given on 9/13/2022. Patient's next injection of Aimovig due 10/11/2022.    Reviewed and verified with patient: Yes  Specialty medication(s) and dose(s) confirmed: yes    Refill Questions    Flowsheet Row Most Recent Value   Changes to allergies? No   Changes to medications? No   New conditions since last clinic visit No   Unplanned office visit, urgent care, ED, or hospital admission in the last 4 weeks  No   How does patient/caregiver feel medication is working? Excellent   Financial problems or insurance changes  No   If yes, describe changes in insurance or financial issues. N/A   Since the previous refill, were any specialty medication doses or scheduled injections missed or delayed?  No   If yes, please provide the amount N/A   Why were doses missed? N/A   Does this patient require a clinical escalation to a pharmacist? No          Delivery Questions    Flowsheet Row Most Recent Value   Delivery method [employer]Ex  [MEDICAID SIGNATURE REQUIRED. Ship Tuesday 10/4/2022. Delivery Wednesday 10/5/2022.]   Delivery address correct? Yes   Preferred delivery time? Anytime   Number of medications in delivery 1   Medication being filled and delivered Aimvoig   Doses left of specialty medications None. Once monthly injection.   Is there any medication that is due not being filled? No   Supplies needed? No supplies needed   Cooler needed? Yes   Do any medications need mixed or dated? No   Copay form of payment Credit card on file   Questions or concerns for the pharmacist? No   Are any medications first time fills? No            Medication Adherence    Adherence tools used: calendar  Support network for adherence: healthcare provider   Other support network: Pharmacy           Follow-up:  28 days     Janel Doll, Pharmacy Technician  Specialty  Pharmacy Technician

## 2022-10-11 ENCOUNTER — OFFICE VISIT (OUTPATIENT)
Dept: NEUROLOGY | Facility: CLINIC | Age: 32
End: 2022-10-11

## 2022-10-11 VITALS
BODY MASS INDEX: 28.16 KG/M2 | TEMPERATURE: 97.3 F | OXYGEN SATURATION: 98 % | DIASTOLIC BLOOD PRESSURE: 40 MMHG | SYSTOLIC BLOOD PRESSURE: 104 MMHG | WEIGHT: 153 LBS | HEART RATE: 91 BPM | HEIGHT: 62 IN

## 2022-10-11 DIAGNOSIS — Z72.0 TOBACCO ABUSE: ICD-10-CM

## 2022-10-11 DIAGNOSIS — G43.709 CHRONIC MIGRAINE WITHOUT AURA WITHOUT STATUS MIGRAINOSUS, NOT INTRACTABLE: ICD-10-CM

## 2022-10-11 DIAGNOSIS — G93.2 IIH (IDIOPATHIC INTRACRANIAL HYPERTENSION): Primary | ICD-10-CM

## 2022-10-11 PROCEDURE — 99214 OFFICE O/P EST MOD 30 MIN: CPT | Performed by: NURSE PRACTITIONER

## 2022-10-11 RX ORDER — ACETAZOLAMIDE 500 MG/1
500 CAPSULE, EXTENDED RELEASE ORAL 2 TIMES DAILY
Qty: 60 CAPSULE | Refills: 2 | Status: SHIPPED | OUTPATIENT
Start: 2022-10-11

## 2022-10-11 RX ORDER — SUMATRIPTAN 50 MG/1
TABLET, FILM COATED ORAL
Qty: 12 TABLET | Refills: 2 | Status: SHIPPED | OUTPATIENT
Start: 2022-10-11

## 2022-10-11 RX ORDER — ONDANSETRON 8 MG/1
8 TABLET, ORALLY DISINTEGRATING ORAL EVERY 12 HOURS PRN
Qty: 60 TABLET | Refills: 1 | Status: SHIPPED | OUTPATIENT
Start: 2022-10-11

## 2022-10-11 NOTE — PROGRESS NOTES
Neuro Office Visit      Encounter Date: 10/11/2022   Patient Name: Dominga Suarez  : 1990   MRN: [id number]   PCP: Dr Miguel Angel Seo  Chief Complaint:    Chief Complaint   Patient presents with   • Idiopathic intracranial hypertension       History of Present Illness: Dominga Suarez is a 32 y.o. female who is here today in Neurology for IIH, chronic migraine, smoking cessation,      Last visit 22 w me-repeat LP after weight loss, eye exam for papilledema, cont emgality and imitrex, add elavil, chantix and nicoderm lozenges.    IIH  Vision is blurred only w migraines.  Eye examOPHTHALMOLOGY - SCAN - F/U, [facility], 2022 (2022)-still with mild papiledema  Post-Procedure Note by Prabha Barnard PA (2022 07:45)-opening pressure 22cm H20.  Patient is down to 155 pounds and does not wish to lose more weight.    Diamox makes her feel foggy-not allergy, but side effect.    PH  Lost 89 pounds after gastric sleeve  Optic nerve edema on exam  Opening pressure 31 cm H20  MRI nml per pt report    Migraine Headaches  Using Aimovig which is effective.  Had 5-6 HA last months. Wears off last week. Using imitrex as effective abortives.      PH  Days per month: 7  Location:top of head, temples, occipital     Quality: Aching and pressure     Duration: hours to 1 day  Severity: 6/10  Triggers: none  Associated Symptoms: Nausea, Vomiting, Photophobia, Phonophobia,  Vision changes blurred and Tinnitus  Aura: none     Abortives: IBU, excedrin  Preventives: TPM, SSRI, BB contraindicated due to dizziness and low blood sugar              Smoking Cessation  Chantix and nicotine lozenges. She didn't use chantix. Afraid of side effects.    PMH: Gastric sleeve 2021, renal stones  Subjective      Past Medical History:   Past Medical History:   Diagnosis Date   • Calcium kidney stones    • Chronic headaches    • Headache, tension-type    • Herpes simplex    • Migraine        Past Surgical  History:   Past Surgical History:   Procedure Laterality Date   • DILATATION AND CURETTAGE     • GASTRIC SLEEVE LAPAROSCOPIC  04/20/2021   • KIDNEY STONE SURGERY     • LUMBAR PUNCTURE  09/06/2022   • TUBAL ABDOMINAL LIGATION     • WRIST SURGERY         Family History:   Family History   Problem Relation Age of Onset   • Breast cancer Maternal Grandmother    • Migraines Maternal Grandmother    • Colon cancer Paternal Grandfather        Social History:   Social History     Socioeconomic History   • Marital status: Single   Tobacco Use   • Smoking status: Every Day     Packs/day: 0.50     Years: 15.00     Pack years: 7.50     Types: Cigarettes, Electronic Cigarette   • Smokeless tobacco: Never   Vaping Use   • Vaping Use: Never used   Substance and Sexual Activity   • Alcohol use: Never   • Drug use: Not Currently   • Sexual activity: Yes     Partners: Male     Birth control/protection: Condom, Tubal ligation       Medications:     Current Outpatient Medications:   •  acyclovir (ZOVIRAX) 400 MG tablet, Take 400 mg by mouth 2 (two) times a day., Disp: , Rfl:   •  Erenumab-aooe (AIMOVIG) 140 MG/ML prefilled syringe, Inject 1 mL under the skin into the appropriate area as directed Every 28 (Twenty-Eight) Days., Disp: 1 mL, Rfl: 11  •  omeprazole (priLOSEC) 40 MG capsule, Take 40 mg by mouth Daily., Disp: , Rfl:   •  ondansetron ODT (ZOFRAN-ODT) 8 MG disintegrating tablet, Place 1 tablet on the tongue Every 12 (Twelve) Hours As Needed for Nausea or Vomiting., Disp: 60 tablet, Rfl: 1  •  SUMAtriptan (Imitrex) 50 MG tablet, Take one tablet at onset of headache. May repeat dose one time in 2 hours if headache not relieved., Disp: 12 tablet, Rfl: 2  •  acetaZOLAMIDE (DIAMOX) 500 MG capsule, Take 1 capsule by mouth 2 (Two) Times a Day., Disp: 60 capsule, Rfl: 2    Allergies:   No Known Allergies    PHQ-9 Total Score:     STEADI Fall Risk Assessment has not been completed.    Objective     Physical Exam:   Physical Exam  Eyes:       Pupils: Pupils are equal, round, and reactive to light.   Neurological:      Mental Status: She is oriented to person, place, and time.      Gait: Gait is intact.      Deep Tendon Reflexes:      Reflex Scores:       Tricep reflexes are 2+ on the right side and 2+ on the left side.       Bicep reflexes are 2+ on the right side and 2+ on the left side.       Brachioradialis reflexes are 2+ on the right side and 2+ on the left side.       Patellar reflexes are 2+ on the right side and 2+ on the left side.       Achilles reflexes are 2+ on the right side and 2+ on the left side.  Psychiatric:         Speech: Speech normal.         Neurologic Exam     Mental Status   Oriented to person, place, and time.   Follows 3 step commands.   Attention: normal. Concentration: normal.   Speech: speech is normal   Level of consciousness: alert  Knowledge: consistent with education.   Normal comprehension.     Cranial Nerves     CN III, IV, VI   Pupils are equal, round, and reactive to light.  Right pupil: Accommodation: intact.   Left pupil: Accommodation: intact.   CN III: no CN III palsy  CN VI: no CN VI palsy  Nystagmus: none   Diplopia: none  Upgaze: normal  Downgaze: normal  Conjugate gaze: present    CN VII   Facial expression full, symmetric.     CN VIII   Hearing: intact    CN XII   CN XII normal.     Motor Exam   Muscle bulk: normal  Overall muscle tone: normal    Strength   Right biceps: 5/5  Left biceps: 5/5  Right triceps: 5/5  Left triceps: 5/5  Right interossei: 5/5  Left interossei: 5/5  Right quadriceps: 5/5  Left quadriceps: 5/5  Right anterior tibial: 5/5  Left anterior tibial: 5/5  Right posterior tibial: 5/5  Left posterior tibial: 5/5    Sensory Exam   Light touch normal.     Gait, Coordination, and Reflexes     Gait  Gait: normal    Tremor   Resting tremor: absent  Action tremor: absent    Reflexes   Right brachioradialis: 2+  Left brachioradialis: 2+  Right biceps: 2+  Left biceps: 2+  Right triceps:  "2+  Left triceps: 2+  Right patellar: 2+  Left patellar: 2+  Right achilles: 2+  Left achilles: 2+  Right : 2+  Left : 2+       Vital Signs:   Vitals:    10/11/22 1328   BP: 104/40   Pulse: 91   Temp: 97.3 °F (36.3 °C)   SpO2: 98%   Weight: 69.4 kg (153 lb)   Height: 157.5 cm (62.01\")     Body mass index is 27.98 kg/m².     Results:   Imaging:   IR LUMBAR PUNCTURE DIAGNOSTIC    Result Date: 9/6/2022  Successful fluoroscopic guided lumbar puncture as above with no immediate complications. Opening pressure was 31 cm H2O. Closing pressure was 19 cm H2O.    This report was finalized on 9/6/2022 10:38 PM by Dr. Franck Frye MD.           Assessment / Plan      Assessment/Plan:   Diagnoses and all orders for this visit:    1. IIH (idiopathic intracranial hypertension) (Primary)  Comments:  Resume diamox.  Orders:  -     acetaZOLAMIDE (DIAMOX) 500 MG capsule; Take 1 capsule by mouth 2 (Two) Times a Day.  Dispense: 60 capsule; Refill: 2  -     SUMAtriptan (Imitrex) 50 MG tablet; Take one tablet at onset of headache. May repeat dose one time in 2 hours if headache not relieved.  Dispense: 12 tablet; Refill: 2  -     ondansetron ODT (ZOFRAN-ODT) 8 MG disintegrating tablet; Place 1 tablet on the tongue Every 12 (Twelve) Hours As Needed for Nausea or Vomiting.  Dispense: 60 tablet; Refill: 1    2. Chronic migraine without aura without status migrainosus, not intractable  Comments:  Cont Aimovig with prn imitrex and zofran    3. Tobacco abuse  Comments:  Did not try chantix or nicotine lozenges. Cont efforts to reduce tob use.           Patient Education:     Reviewed medications, potential side effects and signs and symptoms to report. Discussed risk versus benefits of treatment plan with patient and/or family-including medications, labs and radiology that may be ordered. Addressed questions and concerns during visit. Patient and/or family verbalized understanding and agree with plan. Instructed to call the office with " any questions and report to ER with any life-threatening symptoms.     Follow Up:   Return in about 6 months (around 4/11/2023).    During this visit the following were done:  Labs Reviewed []    Labs Ordered []    Radiology Reports Reviewed []    Radiology Ordered []    PCP Records Reviewed []    Referring Provider Records Reviewed []    ER Records Reviewed []    Hospital Records Reviewed []    History Obtained From Family []    Radiology Images Reviewed []    Other Reviewed [x]    Records Requested []      Boston Anguiano, DNP, APRN

## 2022-11-03 ENCOUNTER — SPECIALTY PHARMACY (OUTPATIENT)
Dept: ONCOLOGY | Facility: HOSPITAL | Age: 32
End: 2022-11-03

## 2022-11-03 NOTE — PROGRESS NOTES
Specialty Pharmacy Refill Coordination Note     Dominga is a 32 y.o. female contacted today regarding refills of Aimovig specialty medication(s). Patient's last injection of Aimovig was given on 10/11/2022. Patient's next injection of Aimovig due 11/8/2022.    Reviewed and verified with patient: Yes  Specialty medication(s) and dose(s) confirmed: yes    Refill Questions    Flowsheet Row Most Recent Value   Changes to allergies? No   Changes to medications? No   New conditions since last clinic visit No   Unplanned office visit, urgent care, ED, or hospital admission in the last 4 weeks  No   How does patient/caregiver feel medication is working? Excellent   Financial problems or insurance changes  No   If yes, describe changes in insurance or financial issues. N/A   Since the previous refill, were any specialty medication doses or scheduled injections missed or delayed?  No   If yes, please provide the amount N/A   Why were doses missed? N/A   Does this patient require a clinical escalation to a pharmacist? No          Delivery Questions    Flowsheet Row Most Recent Value   Delivery method [employer]  [MEDICAID SIGNATURE REQUIRED. Ship Monday 11/7/2022. Delivery Tuesday 11/8/2022.]   Delivery address correct? Yes   Preferred delivery time? Anytime   Number of medications in delivery 1   Medication being filled and delivered Aimvoig   Doses left of specialty medications None. Once monthly injection.   Is there any medication that is due not being filled? No   Supplies needed? No supplies needed   Cooler needed? Yes   Do any medications need mixed or dated? No   Copay form of payment Credit card on file   Questions or concerns for the pharmacist? No   Are any medications first time fills? No            Medication Adherence    Adherence tools used: calendar  Support network for adherence: healthcare provider   Other support network: Pharmacy           Follow-up:  28 days     Janel Doll, Pharmacy Technician  Specialty  Pharmacy Technician

## 2022-11-23 ENCOUNTER — SPECIALTY PHARMACY (OUTPATIENT)
Dept: ONCOLOGY | Facility: HOSPITAL | Age: 32
End: 2022-11-23

## 2022-11-23 NOTE — PROGRESS NOTES
Specialty Pharmacy Refill Coordination Note     Dominga is a 32 y.o. female contacted today regarding refills of  Aimovig specialty medication(s). Patient is due for injection on 12/6.      Reviewed and verified with patient:       Specialty medication(s) and dose(s) confirmed: yes    Refill Questions    Flowsheet Row Most Recent Value   Changes to allergies? No   Changes to medications? No   New conditions since last clinic visit No   Unplanned office visit, urgent care, ED, or hospital admission in the last 4 weeks  No   How does patient/caregiver feel medication is working? Excellent   Financial problems or insurance changes  No   If yes, describe changes in insurance or financial issues. N/A   Since the previous refill, were any specialty medication doses or scheduled injections missed or delayed?  No   If yes, please provide the amount N/A   Why were doses missed? N/A   Does this patient require a clinical escalation to a pharmacist? No          Delivery Questions    Flowsheet Row Most Recent Value   Delivery method [employer]   Delivery address correct? Yes   Preferred delivery time? Anytime   Number of medications in delivery 1   Medication being filled and delivered Aimvoig   Doses left of specialty medications 0   Is there any medication that is due not being filled? No   Supplies needed? No supplies needed   Cooler needed? Yes   Copay form of payment Payment plan already set up   Questions or concerns for the pharmacist? No   Are any medications first time fills? No            Medication Adherence    Adherence tools used: calendar  Support network for adherence: healthcare provider   Other support network: Pharmacy           Follow-up: 28 day(s)     Lisa Root, Pharmacy Technician  Specialty Pharmacy Technician

## 2022-12-28 ENCOUNTER — SPECIALTY PHARMACY (OUTPATIENT)
Dept: ONCOLOGY | Facility: HOSPITAL | Age: 32
End: 2022-12-28

## 2022-12-28 NOTE — PROGRESS NOTES
Specialty Pharmacy Refill Coordination Note     Dominga is a 32 y.o. female contacted today regarding refills of  Aimvog specialty medication(s). Patient is due for injection on 1/3.      Reviewed and verified with patient:       Specialty medication(s) and dose(s) confirmed: yes    Refill Questions    Flowsheet Row Most Recent Value   Changes to allergies? No   Changes to medications? No   New conditions since last clinic visit No   Unplanned office visit, urgent care, ED, or hospital admission in the last 4 weeks  No   How does patient/caregiver feel medication is working? Excellent   Financial problems or insurance changes  No   If yes, describe changes in insurance or financial issues. N/A   Since the previous refill, were any specialty medication doses or scheduled injections missed or delayed?  No   If yes, please provide the amount N/A   Why were doses missed? N/A   Does this patient require a clinical escalation to a pharmacist? No          Delivery Questions    Flowsheet Row Most Recent Value   Delivery method [employer]   Delivery address correct? Yes   Preferred delivery time? Anytime   Number of medications in delivery 1   Medication being filled and delivered Aimvoig   Doses left of specialty medications 0   Is there any medication that is due not being filled? No   Supplies needed? No supplies needed   Cooler needed? Yes   Do any medications need mixed or dated? No   Copay form of payment Payment plan already set up   Questions or concerns for the pharmacist? No   Are any medications first time fills? No            Medication Adherence    Adherence tools used: calendar  Support network for adherence: healthcare provider   Other support network: Pharmacy           Follow-up: 28 day(s)     Lisa Root, Pharmacy Technician  Specialty Pharmacy Technician

## 2023-01-12 ENCOUNTER — TELEPHONE (OUTPATIENT)
Dept: NEUROLOGY | Facility: CLINIC | Age: 33
End: 2023-01-12
Payer: COMMERCIAL

## 2023-01-12 NOTE — TELEPHONE ENCOUNTER
I called pt. She did resume the diamox. Has been doing great until 8 days ago. Now with headache for 8 days. Severe and debilitating.  Instructed to go to ER for migraine cocktail. Will provide samples of Nurtec for breakthrough headaches.

## 2023-01-12 NOTE — TELEPHONE ENCOUNTER
Regarding: Migraines   Contact: [phone]  ----- Message from Jamee Ruvalcaba MA sent at 1/12/2023 10:57 AM EST -----       ----- Message from Dominga Suarez to Boston Anguiano, ISABELLA, APRN sent at 1/12/2023 10:56 AM -----   I have had a migraine for 8 days now my sumatriptan is not working and I have the am a big shot about 2 weeks ago and it is crippling it's making me puke it is light sensitive I close my eyes it hurts I'm not sure what to do anymore nothing is helping

## 2023-01-25 ENCOUNTER — SPECIALTY PHARMACY (OUTPATIENT)
Dept: ONCOLOGY | Facility: HOSPITAL | Age: 33
End: 2023-01-25
Payer: COMMERCIAL

## 2023-01-26 PROBLEM — G43.709 CHRONIC MIGRAINE WITHOUT AURA WITHOUT STATUS MIGRAINOSUS, NOT INTRACTABLE: Status: ACTIVE | Noted: 2023-01-26

## 2023-01-26 NOTE — PROGRESS NOTES
Specialty Pharmacy Refill Coordination Note     Dominga is a 32 y.o. female contacted today regarding refills of  Aimovig specialty medication(s).    Reviewed and verified with patient:  Allergies  Meds  Problems       Specialty medication(s) and dose(s) confirmed: yes    Refill Questions    Flowsheet Row Most Recent Value   Changes to allergies? No   Changes to medications? No   New conditions since last clinic visit No   Unplanned office visit, urgent care, ED, or hospital admission in the last 4 weeks  No   How does patient/caregiver feel medication is working? Excellent   Financial problems or insurance changes  No   If yes, describe changes in insurance or financial issues. N/A   Since the previous refill, were any specialty medication doses or scheduled injections missed or delayed?  No   If yes, please provide the amount N/A   Why were doses missed? N/A          Delivery Questions    Flowsheet Row Most Recent Value   Delivery method [employer]Ex   Delivery address correct? Yes   Preferred delivery time? Anytime   Number of medications in delivery 1   Medication being filled and delivered Aimvoig   Doses left of specialty medications 0   Is there any medication that is due not being filled? No   Supplies needed? No supplies needed   Cooler needed? Yes   Do any medications need mixed or dated? No   Copay form of payment Payment plan already set up   Questions or concerns for the pharmacist? No   Are any medications first time fills? No            Medication Adherence    Adherence tools used: calendar  Support network for adherence: healthcare provider   Other support network: Pharmacy           Follow-up: 28 day(s)     Romina Helm, PharmD  1/26/2023

## 2023-01-26 NOTE — PROGRESS NOTES
Specialty Pharmacy Patient Management Program  Neurology Reassessment     Dominga Suarez is a 32 y.o. female with migraines seen by a Neurology provider and enrolled in the Neurology Patient Management program offered by [facility].  A follow-up outreach was conducted, including assessment of continued therapy appropriateness, medication adherence, and side effect incidence and management for  Aimovig.     Changes to Insurance Coverage or Financial Support  [place] Medicaid     Relevant Past Medical History and Comorbidities  Relevant medical history and concomitant health conditions were discussed with the patient. The patient's chart has been reviewed for relevant past medical history and comorbid health conditions and updated as necessary.   Past Medical History:   Diagnosis Date   • Calcium kidney stones    • Chronic headaches    • Headache, tension-type    • Herpes simplex    • Migraine      Social History     Socioeconomic History   • Marital status: Single   Tobacco Use   • Smoking status: Every Day     Packs/day: 0.50     Years: 15.00     Pack years: 7.50     Types: Cigarettes, Electronic Cigarette   • Smokeless tobacco: Never   Vaping Use   • Vaping Use: Never used   Substance and Sexual Activity   • Alcohol use: Never   • Drug use: Not Currently   • Sexual activity: Yes     Partners: Male     Birth control/protection: Condom, Tubal ligation       Problem list reviewed by Romina Helm, PharmD on 1/26/2023 at  9:43 AM    Allergies  Known allergies and reactions were discussed with the patient. The patient's chart has been reviewed for allergy information and updated as necessary.   Patient has no known allergies.    Allergies reviewed by Romina Helm, PharmD on 1/25/2023 at  3:53 PM  Allergies reviewed by Romina Helm PharmJUANITA on 1/26/2023 at  9:43 AM    Relevant Laboratory Values    Common labs    Common Labs 3/4/22 3/4/22    1037 1037   Glucose  89   BUN  13   Creatinine   0.95   Sodium  141   Potassium  4.1   Chloride  103   Calcium  10.0   Albumin  4.70   Total Bilirubin  0.3   Alkaline Phosphatase  89   AST (SGOT)  15   ALT (SGPT)  14   WBC 10.95 (A)    Hemoglobin 14.6    Hematocrit 43.8    Platelets 337    (A) Abnormal value                Current Medication List  This medication list has been reviewed with the patient and evaluated for any interactions or necessary modifications/recommendations, and updated to include all prescription medications, OTC medications, and supplements the patient is currently taking.  This list reflects what is contained in the patient's profile, which has also been marked as reviewed to communicate to other providers it is the most up to date version of the patient's current medication therapy.     Current Outpatient Medications:   •  acyclovir (ZOVIRAX) 400 MG tablet, Take 400 mg by mouth 2 (two) times a day., Disp: , Rfl:   •  Erenumab-aooe (AIMOVIG) 140 MG/ML auto-injector, Inject 1 mL under the skin into the appropriate area as directed Every 28 (Twenty-Eight) Days., Disp: 1 mL, Rfl: 11  •  omeprazole (priLOSEC) 40 MG capsule, Take 40 mg by mouth Daily., Disp: , Rfl:   •  ondansetron ODT (ZOFRAN-ODT) 8 MG disintegrating tablet, Place 1 tablet on the tongue Every 12 (Twelve) Hours As Needed for Nausea or Vomiting., Disp: 60 tablet, Rfl: 1  •  SUMAtriptan (Imitrex) 50 MG tablet, Take one tablet at onset of headache. May repeat dose one time in 2 hours if headache not relieved., Disp: 12 tablet, Rfl: 2  •  acetaZOLAMIDE (DIAMOX) 500 MG capsule, Take 1 capsule by mouth 2 (Two) Times a Day., Disp: 60 capsule, Rfl: 2    Medicines reviewed by Romina Helm, PharmD on 1/25/2023 at  3:39 PM  Medicines reviewed by Romina Helm, PharmD on 1/26/2023 at  9:43 AM    Drug Interactions  none     Adverse Drug Reactions  • Adverse Reactions Experienced: none   • Plan for ADR Management: not required    Hospitalizations and Urgent Care Since Last  Assessment  • Hospitalizations or Admissions: none   • ED Visits: none  • Urgent Office Visits: none     Adherence and Self-Administration  Adherence related patient's specialty therapy was discussed with the patient. The Adherence segment of this outreach has been reviewed and updated.     Medication Adherence    Adherence tools used: calendar  Support network for adherence: healthcare provider   Other support networks: Pharmacy         • Ongoing or New Barriers to Patient Adherence and/or Self-Administration: none   • Methods for Supporting Patient Adherence and/or Self-Administration: patient adept with Aimoivg self-injections     Goals of Therapy  Goals related to the patient's specialty therapy was discussed with the patient. The Patient Goals segment of this outreach has been reviewed and updated.    Goals     • Specialty Pharmacy General Goal      Reduction in severity and frequency of migraines             Quality of Life Assessment   Quality of Life related to the patient's specialty therapy was discussed with the patient. The QOL segment of this outreach has been reviewed and updated.     Quality of Life Assessment  Quality of Life Improvement Scale: Moderately better    Reassessment Plan & Follow-Up  1. Medication Therapy Changes: none  2. Additional Plans, Therapy Recommendations, or Therapy Problems to Be Addressed: none  3. Pharmacist to perform regular reassessments no more than (6) months from the previous assessment.  4. Care Coordinator to set up future refill outreaches, coordinate prescription delivery, and escalate clinical questions to pharmacist.     Attestation  I attest that the specialty medication(s) addressed above are appropriate for the patient based on my reassessment.  If the prescribed therapy is at any point deemed not appropriate based on the current or future assessments, a consultation will be initiated with the patient's specialty care provider to determine the best course of  action. The revised plan of therapy will be documented along with any additional patient education provided.     Romina Helm, PharmD  1/26/2023  09:44 EST

## 2023-02-17 ENCOUNTER — SPECIALTY PHARMACY (OUTPATIENT)
Dept: ONCOLOGY | Facility: HOSPITAL | Age: 33
End: 2023-02-17
Payer: COMMERCIAL

## 2023-02-17 NOTE — PROGRESS NOTES
Specialty Pharmacy Refill Coordination Note     Dominga is a 32 y.o. female contacted today regarding refills of  Aimovig specialty medication(s). Patient is due for injection on 2/28.      Reviewed and verified with patient:       Specialty medication(s) and dose(s) confirmed: yes    Refill Questions    Flowsheet Row Most Recent Value   Changes to allergies? No   Changes to medications? No   New conditions since last clinic visit No   Unplanned office visit, urgent care, ED, or hospital admission in the last 4 weeks  No   How does patient/caregiver feel medication is working? Excellent   Financial problems or insurance changes  No   If yes, describe changes in insurance or financial issues. N/A   Since the previous refill, were any specialty medication doses or scheduled injections missed or delayed?  No   If yes, please provide the amount N/A   Why were doses missed? N/A   Does this patient require a clinical escalation to a pharmacist? No          Delivery Questions    Flowsheet Row Most Recent Value   Delivery method FedEx   Delivery address correct? Yes   Preferred delivery time? Anytime   Number of medications in delivery 1   Medication being filled and delivered Aimvoig   Doses left of specialty medications 0   Is there any medication that is due not being filled? No   Supplies needed? No supplies needed   Cooler needed? Yes   Do any medications need mixed or dated? No   Copay form of payment Payment plan already set up   Questions or concerns for the pharmacist? No   Are any medications first time fills? No            Medication Adherence    Adherence tools used: calendar  Support network for adherence: healthcare provider   Other support network: Pharmacy           Follow-up: 28 day(s)     Lisa Root, Pharmacy Technician  Specialty Pharmacy Technician

## 2023-03-21 ENCOUNTER — SPECIALTY PHARMACY (OUTPATIENT)
Dept: ONCOLOGY | Facility: HOSPITAL | Age: 33
End: 2023-03-21
Payer: COMMERCIAL

## 2023-03-21 NOTE — PROGRESS NOTES
Specialty Pharmacy Refill Coordination Note     Dominga is a 32 y.o. female contacted today regarding refills of  Aimovig specialty medication(s). Patient is due for injection on 3/28.      Reviewed and verified with patient:       Specialty medication(s) and dose(s) confirmed: yes    Refill Questions    Flowsheet Row Most Recent Value   Changes to allergies? No   Changes to medications? No   New conditions since last clinic visit No   Unplanned office visit, urgent care, ED, or hospital admission in the last 4 weeks  No   How does patient/caregiver feel medication is working? Excellent   Financial problems or insurance changes  No   If yes, describe changes in insurance or financial issues. N/A   Since the previous refill, were any specialty medication doses or scheduled injections missed or delayed?  No   If yes, please provide the amount N/A   Why were doses missed? N/A   Does this patient require a clinical escalation to a pharmacist? No          Delivery Questions    Flowsheet Row Most Recent Value   Delivery method FedEx   Delivery address correct? Yes   Preferred delivery time? Anytime   Number of medications in delivery 1   Medication being filled and delivered Aimvoig   Doses left of specialty medications 0   Is there any medication that is due not being filled? No   Supplies needed? No supplies needed   Cooler needed? Yes   Do any medications need mixed or dated? No   Copay form of payment Payment plan already set up   Questions or concerns for the pharmacist? No   Are any medications first time fills? No            Medication Adherence    Adherence tools used: calendar  Support network for adherence: healthcare provider   Other support network: Pharmacy           Follow-up: 28 day(s)     Lisa Root, Pharmacy Technician  Specialty Pharmacy Technician      
Chronic back pain

## 2023-03-23 ENCOUNTER — TELEPHONE (OUTPATIENT)
Dept: NEUROLOGY | Facility: CLINIC | Age: 33
End: 2023-03-23
Payer: COMMERCIAL

## 2023-03-23 ENCOUNTER — SPECIALTY PHARMACY (OUTPATIENT)
Dept: ONCOLOGY | Facility: HOSPITAL | Age: 33
End: 2023-03-23
Payer: COMMERCIAL

## 2023-04-10 ENCOUNTER — OFFICE VISIT (OUTPATIENT)
Dept: NEUROLOGY | Facility: CLINIC | Age: 33
End: 2023-04-10
Payer: COMMERCIAL

## 2023-04-10 ENCOUNTER — LAB (OUTPATIENT)
Dept: LAB | Facility: HOSPITAL | Age: 33
End: 2023-04-10
Payer: COMMERCIAL

## 2023-04-10 VITALS
BODY MASS INDEX: 27.05 KG/M2 | HEART RATE: 83 BPM | SYSTOLIC BLOOD PRESSURE: 108 MMHG | OXYGEN SATURATION: 99 % | HEIGHT: 62 IN | WEIGHT: 147 LBS | DIASTOLIC BLOOD PRESSURE: 62 MMHG

## 2023-04-10 DIAGNOSIS — G43.709 CHRONIC MIGRAINE WITHOUT AURA WITHOUT STATUS MIGRAINOSUS, NOT INTRACTABLE: Primary | ICD-10-CM

## 2023-04-10 DIAGNOSIS — M79.609 PARESTHESIA AND PAIN OF LEFT EXTREMITY: ICD-10-CM

## 2023-04-10 DIAGNOSIS — G93.2 IIH (IDIOPATHIC INTRACRANIAL HYPERTENSION): ICD-10-CM

## 2023-04-10 DIAGNOSIS — R20.2 PARESTHESIA AND PAIN OF LEFT EXTREMITY: ICD-10-CM

## 2023-04-10 PROBLEM — Z12.4 ENCOUNTER FOR PAP SMEAR OF CERVIX WITH HPV DNA COTESTING: Status: ACTIVE | Noted: 2019-12-12

## 2023-04-10 PROBLEM — B00.9 HERPES SIMPLEX VIRUS (HSV) INFECTION: Status: ACTIVE | Noted: 2019-04-17

## 2023-04-10 PROBLEM — E66.9 OBESITY: Status: ACTIVE | Noted: 2019-12-11

## 2023-04-10 PROBLEM — L91.8 SKIN TAG: Status: ACTIVE | Noted: 2021-05-03

## 2023-04-10 PROBLEM — B37.31 CANDIDIASIS, VULVA: Status: ACTIVE | Noted: 2018-01-05

## 2023-04-10 PROBLEM — F32.A ANXIETY AND DEPRESSION: Status: ACTIVE | Noted: 2019-12-11

## 2023-04-10 PROBLEM — N20.0 BILATERAL KIDNEY STONES: Status: ACTIVE | Noted: 2020-10-23

## 2023-04-10 PROBLEM — Z20.2 EXPOSURE TO STD: Status: ACTIVE | Noted: 2019-11-05

## 2023-04-10 PROBLEM — R93.1 ABNORMAL ECHOCARDIOGRAM: Status: ACTIVE | Noted: 2018-01-29

## 2023-04-10 PROBLEM — N89.8 VAGINAL DISCHARGE: Status: ACTIVE | Noted: 2019-12-11

## 2023-04-10 PROBLEM — N39.46 URGE AND STRESS INCONTINENCE: Status: ACTIVE | Noted: 2021-04-28

## 2023-04-10 PROBLEM — F41.9 ANXIETY AND DEPRESSION: Status: ACTIVE | Noted: 2019-12-11

## 2023-04-10 PROBLEM — F19.11 INTRAVENOUS DRUG ABUSE IN REMISSION: Status: ACTIVE | Noted: 2017-08-28

## 2023-04-10 PROBLEM — Z96.0 PRESENCE OF PESSARY: Status: ACTIVE | Noted: 2021-05-03

## 2023-04-10 PROBLEM — A74.9 CHLAMYDIA: Status: ACTIVE | Noted: 2019-11-06

## 2023-04-10 PROBLEM — N39.3 STRESS INCONTINENCE, FEMALE: Status: ACTIVE | Noted: 2021-05-03

## 2023-04-10 LAB
ALBUMIN SERPL-MCNC: 4.3 G/DL (ref 3.5–5.2)
ALBUMIN/GLOB SERPL: 2 G/DL
ALP SERPL-CCNC: 74 U/L (ref 39–117)
ALT SERPL W P-5'-P-CCNC: 13 U/L (ref 1–33)
ANION GAP SERPL CALCULATED.3IONS-SCNC: 7.9 MMOL/L (ref 5–15)
AST SERPL-CCNC: 17 U/L (ref 1–32)
BASOPHILS # BLD AUTO: 0.04 10*3/MM3 (ref 0–0.2)
BASOPHILS NFR BLD AUTO: 0.5 % (ref 0–1.5)
BILIRUB SERPL-MCNC: 0.4 MG/DL (ref 0–1.2)
BUN SERPL-MCNC: 9 MG/DL (ref 6–20)
BUN/CREAT SERPL: 12 (ref 7–25)
CALCIUM SPEC-SCNC: 8.9 MG/DL (ref 8.6–10.5)
CHLORIDE SERPL-SCNC: 102 MMOL/L (ref 98–107)
CK SERPL-CCNC: 51 U/L (ref 20–180)
CO2 SERPL-SCNC: 28.1 MMOL/L (ref 22–29)
CREAT SERPL-MCNC: 0.75 MG/DL (ref 0.57–1)
CRP SERPL-MCNC: <0.3 MG/DL (ref 0–0.5)
DEPRECATED RDW RBC AUTO: 41.6 FL (ref 37–54)
EGFRCR SERPLBLD CKD-EPI 2021: 107.3 ML/MIN/1.73
EOSINOPHIL # BLD AUTO: 0.22 10*3/MM3 (ref 0–0.4)
EOSINOPHIL NFR BLD AUTO: 2.5 % (ref 0.3–6.2)
ERYTHROCYTE [DISTWIDTH] IN BLOOD BY AUTOMATED COUNT: 12.8 % (ref 12.3–15.4)
FOLATE SERPL-MCNC: 6.49 NG/ML (ref 4.78–24.2)
GLOBULIN UR ELPH-MCNC: 2.1 GM/DL
GLUCOSE SERPL-MCNC: 101 MG/DL (ref 65–99)
HBA1C MFR BLD: 4.9 % (ref 4.8–5.6)
HCT VFR BLD AUTO: 38.6 % (ref 34–46.6)
HGB BLD-MCNC: 12.7 G/DL (ref 12–15.9)
IMM GRANULOCYTES # BLD AUTO: 0.01 10*3/MM3 (ref 0–0.05)
IMM GRANULOCYTES NFR BLD AUTO: 0.1 % (ref 0–0.5)
LYMPHOCYTES # BLD AUTO: 2.14 10*3/MM3 (ref 0.7–3.1)
LYMPHOCYTES NFR BLD AUTO: 24.5 % (ref 19.6–45.3)
MCH RBC QN AUTO: 29.3 PG (ref 26.6–33)
MCHC RBC AUTO-ENTMCNC: 32.9 G/DL (ref 31.5–35.7)
MCV RBC AUTO: 89.1 FL (ref 79–97)
MONOCYTES # BLD AUTO: 0.53 10*3/MM3 (ref 0.1–0.9)
MONOCYTES NFR BLD AUTO: 6.1 % (ref 5–12)
NEUTROPHILS NFR BLD AUTO: 5.81 10*3/MM3 (ref 1.7–7)
NEUTROPHILS NFR BLD AUTO: 66.3 % (ref 42.7–76)
NRBC BLD AUTO-RTO: 0 /100 WBC (ref 0–0.2)
PLATELET # BLD AUTO: 223 10*3/MM3 (ref 140–450)
PMV BLD AUTO: 11.4 FL (ref 6–12)
POTASSIUM SERPL-SCNC: 4 MMOL/L (ref 3.5–5.2)
PROT SERPL-MCNC: 6.4 G/DL (ref 6–8.5)
RBC # BLD AUTO: 4.33 10*6/MM3 (ref 3.77–5.28)
SODIUM SERPL-SCNC: 138 MMOL/L (ref 136–145)
TSH SERPL DL<=0.05 MIU/L-ACNC: 1.08 UIU/ML (ref 0.27–4.2)
VIT B12 BLD-MCNC: 741 PG/ML (ref 211–946)
WBC NRBC COR # BLD: 8.75 10*3/MM3 (ref 3.4–10.8)

## 2023-04-10 PROCEDURE — 82607 VITAMIN B-12: CPT

## 2023-04-10 PROCEDURE — 82746 ASSAY OF FOLIC ACID SERUM: CPT

## 2023-04-10 PROCEDURE — 84165 PROTEIN E-PHORESIS SERUM: CPT

## 2023-04-10 PROCEDURE — 85025 COMPLETE CBC W/AUTO DIFF WBC: CPT

## 2023-04-10 PROCEDURE — 1159F MED LIST DOCD IN RCRD: CPT | Performed by: NURSE PRACTITIONER

## 2023-04-10 PROCEDURE — 80053 COMPREHEN METABOLIC PANEL: CPT

## 2023-04-10 PROCEDURE — 83036 HEMOGLOBIN GLYCOSYLATED A1C: CPT

## 2023-04-10 PROCEDURE — 1160F RVW MEDS BY RX/DR IN RCRD: CPT | Performed by: NURSE PRACTITIONER

## 2023-04-10 PROCEDURE — 82784 ASSAY IGA/IGD/IGG/IGM EACH: CPT

## 2023-04-10 PROCEDURE — 82550 ASSAY OF CK (CPK): CPT

## 2023-04-10 PROCEDURE — 86140 C-REACTIVE PROTEIN: CPT

## 2023-04-10 PROCEDURE — 82595 ASSAY OF CRYOGLOBULIN: CPT

## 2023-04-10 PROCEDURE — 84443 ASSAY THYROID STIM HORMONE: CPT

## 2023-04-10 PROCEDURE — 86334 IMMUNOFIX E-PHORESIS SERUM: CPT

## 2023-04-10 PROCEDURE — 99214 OFFICE O/P EST MOD 30 MIN: CPT | Performed by: NURSE PRACTITIONER

## 2023-04-10 PROCEDURE — 36415 COLL VENOUS BLD VENIPUNCTURE: CPT

## 2023-04-10 RX ORDER — ONDANSETRON 8 MG/1
8 TABLET, ORALLY DISINTEGRATING ORAL EVERY 12 HOURS PRN
Qty: 60 TABLET | Refills: 1 | Status: SHIPPED | OUTPATIENT
Start: 2023-04-10

## 2023-04-10 RX ORDER — OMEPRAZOLE 20 MG/1
20 CAPSULE, DELAYED RELEASE ORAL DAILY
COMMUNITY

## 2023-04-10 RX ORDER — ACETAZOLAMIDE 500 MG/1
500 CAPSULE, EXTENDED RELEASE ORAL 2 TIMES DAILY
Qty: 180 CAPSULE | Refills: 3 | Status: SHIPPED | OUTPATIENT
Start: 2023-04-10

## 2023-04-10 NOTE — PROGRESS NOTES
Neuro Office Visit      Encounter Date: 04/10/2023   Patient Name: Dominga Suarez  : 1990   MRN: [id number]   PCP: Dr Seo  Chief Complaint:    Chief Complaint   Patient presents with   • Idiopathic intracranial hypertension   • Migraine       History of Present Illness: Dominga Suarez is a 32 y.o. female who is here today in Neurology for  IIH, migraines and new complaint of paresthesia.      Last visit 10/11/2022 w me-resume diamox. Cont Imitrex and zofran w Aimovig. Encourage smoking cessation.    Interval history  Headaches returned x 8 days. Instructed to go to ER. Samples of Nurtec.    IIH  Ran out of Diamox. Needs a refill. Due for eye exam.  Eye examOPHTHALMOLOGY - SCAN - F/U, [facility], 2022 (2022)-still with mild papiledema  Post-Procedure Note by Prabha Barnard PA (2022 07:45)-opening pressure 22cm H20.  Patient is down to 155 pounds and does not wish to lose more weight.     Diamox makes her feel foggy-not allergy, but side effect.     PH  Lost 89 pounds after gastric sleeve  Optic nerve edema on exam  Opening pressure 31 cm H20  MRI nml per pt report    Migraine Headaches  Using Aimovig which is effective.  Had 2 headaches this year that each last 4-5 days. Wears off last week. Using imitrex but not effective. Did not  samples of Nurtec.    Days per month: 7  Location:top of head, temples, occipital     Quality: Aching and pressure     Duration: hours to 1 day  Severity: 6/10  Triggers: none  Associated Symptoms: Nausea, Vomiting, Photophobia, Phonophobia,  Vision changes blurred and Tinnitus  Aura: none     Abortives: IBU, excedrin  Preventives: TPM, SSRI, BB contraindicated due to dizziness and low blood sugar. Aimovig           Smoking Cessation  Down to a 1/3 pack  Chantix and nicotine lozenges. She didn't use chantix. Afraid of side effects.    Paresthesia   1 year ago developed LUE and LLE numbness with pins and needles. Can be burning. It is  positional. Does not drink. She is smoking. History of gastric bypass.  No weakness. Works as a .    MRI brain reviewed without lesions.     PMH: Gastric sleeve April 2021, renal stones  Subjective      Past Medical History:   Past Medical History:   Diagnosis Date   • Calcium kidney stones    • Chronic headaches    • Headache, tension-type    • Herpes simplex    • Migraine        Past Surgical History:   Past Surgical History:   Procedure Laterality Date   • DILATATION AND CURETTAGE     • GASTRIC SLEEVE LAPAROSCOPIC  04/20/2021   • KIDNEY STONE SURGERY     • LUMBAR PUNCTURE  09/06/2022   • TUBAL ABDOMINAL LIGATION     • WRIST SURGERY         Family History:   Family History   Problem Relation Age of Onset   • Breast cancer Maternal Grandmother    • Migraines Maternal Grandmother    • Colon cancer Paternal Grandfather        Social History:   Social History     Socioeconomic History   • Marital status: Single   Tobacco Use   • Smoking status: Every Day     Packs/day: 0.50     Years: 15.00     Pack years: 7.50     Types: Cigarettes, Electronic Cigarette   • Smokeless tobacco: Never   Vaping Use   • Vaping Use: Never used   Substance and Sexual Activity   • Alcohol use: Never   • Drug use: Not Currently   • Sexual activity: Yes     Partners: Male     Birth control/protection: Condom, Tubal ligation       Medications:     Current Outpatient Medications:   •  acetaZOLAMIDE (DIAMOX) 500 MG capsule, Take 1 capsule by mouth 2 (Two) Times a Day., Disp: 180 capsule, Rfl: 3  •  acyclovir (ZOVIRAX) 400 MG tablet, Take 1 tablet by mouth 2 (two) times a day., Disp: , Rfl:   •  Erenumab-aooe (AIMOVIG) 140 MG/ML auto-injector, Inject 1 mL under the skin into the appropriate area as directed Every 28 (Twenty-Eight) Days., Disp: 1 mL, Rfl: 11  •  ondansetron ODT (ZOFRAN-ODT) 8 MG disintegrating tablet, Place 1 tablet on the tongue Every 12 (Twelve) Hours As Needed for Nausea or Vomiting., Disp: 60 tablet, Rfl: 1  •   SUMAtriptan (Imitrex) 50 MG tablet, Take one tablet at onset of headache. May repeat dose one time in 2 hours if headache not relieved., Disp: 12 tablet, Rfl: 2  •  omeprazole (priLOSEC) 20 MG capsule, Take 1 capsule by mouth Daily., Disp: , Rfl:     Allergies:   No Known Allergies    PHQ-9 Total Score:     STEADI Fall Risk Assessment has not been completed.    Objective     Physical Exam:   Physical Exam  Eyes:      Pupils: Pupils are equal, round, and reactive to light.   Neurological:      Mental Status: She is oriented to person, place, and time.      Gait: Gait is intact.      Deep Tendon Reflexes:      Reflex Scores:       Tricep reflexes are 2+ on the right side and 2+ on the left side.       Bicep reflexes are 2+ on the right side and 2+ on the left side.       Brachioradialis reflexes are 2+ on the right side and 2+ on the left side.       Patellar reflexes are 2+ on the right side and 2+ on the left side.       Achilles reflexes are 2+ on the right side and 2+ on the left side.  Psychiatric:         Speech: Speech normal.         Neurologic Exam     Mental Status   Oriented to person, place, and time.   Follows 3 step commands.   Attention: normal. Concentration: normal.   Speech: speech is normal   Level of consciousness: alert  Knowledge: consistent with education.   Normal comprehension.     Cranial Nerves     CN III, IV, VI   Pupils are equal, round, and reactive to light.  Right pupil: Accommodation: intact.   Left pupil: Accommodation: intact.   CN III: no CN III palsy  CN VI: no CN VI palsy  Nystagmus: none   Diplopia: none  Upgaze: normal  Downgaze: normal  Conjugate gaze: present    CN VII   Facial expression full, symmetric.     CN VIII   Hearing: intact    CN XII   CN XII normal.     Motor Exam   Muscle bulk: normal  Overall muscle tone: normal    Strength   Right biceps: 5/5  Left biceps: 5/5  Right triceps: 5/5  Left triceps: 5/5  Right interossei: 5/5  Left interossei: 5/5  Right quadriceps:  "5/5  Left quadriceps: 5/5  Right anterior tibial: 5/5  Left anterior tibial: 5/5  Right posterior tibial: 5/5  Left posterior tibial: 5/5    Sensory Exam   Light touch normal.     Gait, Coordination, and Reflexes     Gait  Gait: normal    Tremor   Resting tremor: absent  Action tremor: absent    Reflexes   Right brachioradialis: 2+  Left brachioradialis: 2+  Right biceps: 2+  Left biceps: 2+  Right triceps: 2+  Left triceps: 2+  Right patellar: 2+  Left patellar: 2+  Right achilles: 2+  Left achilles: 2+  Right : 2+  Left : 2+       Vital Signs:   Vitals:    04/10/23 1035   BP: 108/62   Pulse: 83   SpO2: 99%   Weight: 66.7 kg (147 lb)   Height: 157.5 cm (62.01\")     Body mass index is 26.88 kg/m².         Assessment / Plan      Assessment/Plan:   Diagnoses and all orders for this visit:    1. Chronic migraine without aura without status migrainosus, not intractable (Primary)  Comments:  Cont Aimovig, samples of Nurtec and Ubrelvy    2. IIH (idiopathic intracranial hypertension)  Comments:  Resume diamox.  Orders:  -     acetaZOLAMIDE (DIAMOX) 500 MG capsule; Take 1 capsule by mouth 2 (Two) Times a Day.  Dispense: 180 capsule; Refill: 3  -     ondansetron ODT (ZOFRAN-ODT) 8 MG disintegrating tablet; Place 1 tablet on the tongue Every 12 (Twelve) Hours As Needed for Nausea or Vomiting.  Dispense: 60 tablet; Refill: 1    3. Paresthesia and pain of left extremity  -     CBC & Differential; Future  -     CK; Future  -     Comprehensive Metabolic Panel; Future  -     C-reactive Protein; Future  -     Cryoglobulin; Future  -     Hemoglobin A1c; Future  -     CHERYL + PE; Future  -     TSH; Future  -     Vitamin B12 & Folate; Future           Patient Education:       Reviewed medications, potential side effects and signs and symptoms to report. Discussed risk versus benefits of treatment plan with patient and/or family-including medications, labs and radiology that may be ordered. Addressed questions and concerns " during visit. Patient and/or family verbalized understanding and agree with plan. Instructed to call the office with any questions and report to ER with any life-threatening symptoms.     Follow Up:   Return in about 6 months (around 10/10/2023) for Recheck.    During this visit the following were done:  Labs Reviewed []    Labs Ordered [x]    Radiology Reports Reviewed [x]    Radiology Ordered []    PCP Records Reviewed []    Referring Provider Records Reviewed []    ER Records Reviewed []    Hospital Records Reviewed []    History Obtained From Family []    Radiology Images Reviewed [x]    Other Reviewed []    Records Requested []      Boston Anguiano, DNP, APRN

## 2023-04-11 LAB
ALBUMIN SERPL ELPH-MCNC: 3.8 G/DL (ref 2.9–4.4)
ALBUMIN/GLOB SERPL: 1.5 {RATIO} (ref 0.7–1.7)
ALPHA1 GLOB SERPL ELPH-MCNC: 0.3 G/DL (ref 0–0.4)
ALPHA2 GLOB SERPL ELPH-MCNC: 0.6 G/DL (ref 0.4–1)
B-GLOBULIN SERPL ELPH-MCNC: 0.9 G/DL (ref 0.7–1.3)
GAMMA GLOB SERPL ELPH-MCNC: 0.8 G/DL (ref 0.4–1.8)
GLOBULIN SER-MCNC: 2.6 G/DL (ref 2.2–3.9)
IGA SERPL-MCNC: 143 MG/DL (ref 87–352)
IGG SERPL-MCNC: 838 MG/DL (ref 586–1602)
IGM SERPL-MCNC: 131 MG/DL (ref 26–217)
INTERPRETATION SERPL IEP-IMP: NORMAL
LABORATORY COMMENT REPORT: NORMAL
M PROTEIN SERPL ELPH-MCNC: NORMAL G/DL
PROT SERPL-MCNC: 6.4 G/DL (ref 6–8.5)

## 2023-04-14 LAB — CRYOGLOB SER QL 1D COLD INC: NORMAL

## 2023-04-17 ENCOUNTER — TELEPHONE (OUTPATIENT)
Dept: NEUROLOGY | Facility: CLINIC | Age: 33
End: 2023-04-17
Payer: COMMERCIAL

## 2023-04-17 NOTE — TELEPHONE ENCOUNTER
Called patient and gave results.  Patient was understanding and appreciative.    ----- Message from Boston Anguiano DNP, APRN sent at 4/14/2023  4:54 PM EDT -----  Please notify pt labs showed no concerning findings.

## 2023-04-19 ENCOUNTER — SPECIALTY PHARMACY (OUTPATIENT)
Dept: ONCOLOGY | Facility: HOSPITAL | Age: 33
End: 2023-04-19
Payer: COMMERCIAL

## 2023-05-17 ENCOUNTER — SPECIALTY PHARMACY (OUTPATIENT)
Dept: ONCOLOGY | Facility: HOSPITAL | Age: 33
End: 2023-05-17
Payer: COMMERCIAL

## 2023-05-17 NOTE — PROGRESS NOTES
Specialty Pharmacy Refill Coordination Note     Dominga is a 33 y.o. female contacted today regarding refills of  Aimovig specialty medication(s). Patient is due for injection on 5/23.      Reviewed and verified with patient:       Specialty medication(s) and dose(s) confirmed: yes    Refill Questions    Flowsheet Row Most Recent Value   Changes to allergies? No   Changes to medications? No   New conditions since last clinic visit No   Unplanned office visit, urgent care, ED, or hospital admission in the last 4 weeks  No   How does patient/caregiver feel medication is working? Excellent   Financial problems or insurance changes  No   If yes, describe changes in insurance or financial issues. N/A   Since the previous refill, were any specialty medication doses or scheduled injections missed or delayed?  No   If yes, please provide the amount N/A   Why were doses missed? N/A   Does this patient require a clinical escalation to a pharmacist? No          Delivery Questions    Flowsheet Row Most Recent Value   Delivery method FedEx   Delivery address correct? Yes   Preferred delivery time? Anytime   Number of medications in delivery 1   Medication being filled and delivered Aimvoig   Doses left of specialty medications 0   Is there any medication that is due not being filled? No   Supplies needed? No supplies needed   Cooler needed? Yes   Do any medications need mixed or dated? No   Copay form of payment Payment plan already set up   Questions or concerns for the pharmacist? No   Are any medications first time fills? No            Medication Adherence    Adherence tools used: calendar  Support network for adherence: healthcare provider   Other support network: Pharmacy           Follow-up: 28 day(s)     Lisa Root, Pharmacy Technician  Specialty Pharmacy Technician

## 2023-06-12 ENCOUNTER — SPECIALTY PHARMACY (OUTPATIENT)
Dept: ONCOLOGY | Facility: HOSPITAL | Age: 33
End: 2023-06-12
Payer: COMMERCIAL

## 2023-06-12 NOTE — PROGRESS NOTES
Specialty Pharmacy Refill Coordination Note     Dominga is a 33 y.o. female contacted today regarding refills of Aimovig specialty medication(s). Patient is due for next injection on 6/19/23.    Reviewed and verified with patient:       Specialty medication(s) and dose(s) confirmed: yes    Refill Questions      Flowsheet Row Most Recent Value   Changes to allergies? No   Changes to medications? No   New conditions since last clinic visit No   Unplanned office visit, urgent care, ED, or hospital admission in the last 4 weeks  No   How does patient/caregiver feel medication is working? Very good   Financial problems or insurance changes  No   If yes, describe changes in insurance or financial issues. N/A   Since the previous refill, were any specialty medication doses or scheduled injections missed or delayed?  No   If yes, please provide the amount N/A   Why were doses missed? N/A   Does this patient require a clinical escalation to a pharmacist? No            Delivery Questions      Flowsheet Row Most Recent Value   Delivery method FedEx   Delivery address correct? Yes   Preferred delivery time? Anytime   Number of medications in delivery 1   Medication being filled and delivered Aimovig   Doses left of specialty medications 0   Is there any medication that is due not being filled? No   Supplies needed? No supplies needed   Cooler needed? Yes   Do any medications need mixed or dated? No   Copay form of payment Payment plan already set up   Additional comments N/A   Questions or concerns for the pharmacist? No   Explain any questions or concerns for the pharmacist N/A   Are any medications first time fills? No              Medication Adherence    Adherence tools used: calendar  Support network for adherence: healthcare provider   Other support network: Pharmacy             Follow-up: 28 day(s)     Ruba Hester, Pharmacy Technician  Specialty Pharmacy Technician

## 2023-08-31 ENCOUNTER — SPECIALTY PHARMACY (OUTPATIENT)
Dept: ONCOLOGY | Facility: HOSPITAL | Age: 33
End: 2023-08-31
Payer: COMMERCIAL

## 2023-08-31 NOTE — PROGRESS NOTES
Specialty Pharmacy Refill Coordination Note     Dominga is a 33 y.o. female contacted today regarding refills of  Aimovig specialty medication(s).. patient Injection is due on 9/19    Reviewed and verified with patient:       Specialty medication(s) and dose(s) confirmed: yes    Refill Questions      Flowsheet Row Most Recent Value   Changes to allergies? No   Changes to medications? No   New conditions since last clinic visit No   Unplanned office visit, urgent care, ED, or hospital admission in the last 4 weeks  No   How does patient/caregiver feel medication is working? Very good   Financial problems or insurance changes  No   If yes, describe changes in insurance or financial issues. N/A   Since the previous refill, were any specialty medication doses or scheduled injections missed or delayed?  No   If yes, please provide the amount N/A   Why were doses missed? N/A   Does this patient require a clinical escalation to a pharmacist? No            Delivery Questions      Flowsheet Row Most Recent Value   Delivery method FedEx   Delivery address correct? Yes   Delivery phone number mobile phone   Preferred delivery time? Anytime   Number of medications in delivery 1   Medication being filled and delivered Aimovig =$0   Doses left of specialty medications N/A   Is there any medication that is due not being filled? No   Supplies needed? No supplies needed   Cooler needed? Yes   Do any medications need mixed or dated? No   Copay form of payment Payment plan already set up   Additional comments N/A   Questions or concerns for the pharmacist? No   Explain any questions or concerns for the pharmacist N/A   Are any medications first time fills? No              Medication Adherence    Adherence tools used: calendar  Support network for adherence: healthcare provider   Other support network: Pharmacy             Follow-up: 30 day(s)     Sherrill Maddox  Specialty Pharmacy Technician

## 2023-10-10 ENCOUNTER — SPECIALTY PHARMACY (OUTPATIENT)
Dept: ONCOLOGY | Facility: HOSPITAL | Age: 33
End: 2023-10-10
Payer: COMMERCIAL

## 2023-10-10 NOTE — PROGRESS NOTES
Specialty Pharmacy Refill Coordination Note     Dominga is a 33 y.o. female contacted today regarding refills of  Aimovig specialty medication(s).. patient Injection is due on 10/19    Reviewed and verified with patient:       Specialty medication(s) and dose(s) confirmed: yes    Refill Questions      Flowsheet Row Most Recent Value   Changes to allergies? No   Changes to medications? No   New conditions since last clinic visit No   Unplanned office visit, urgent care, ED, or hospital admission in the last 4 weeks  No   How does patient/caregiver feel medication is working? Very good   Financial problems or insurance changes  No   If yes, describe changes in insurance or financial issues. N/A   Since the previous refill, were any specialty medication doses or scheduled injections missed or delayed?  No   If yes, please provide the amount N/A   Why were doses missed? N/A   Does this patient require a clinical escalation to a pharmacist? No            Delivery Questions      Flowsheet Row Most Recent Value   Delivery method FedEx   Delivery address correct? Yes   Delivery phone number mobile phone   Preferred delivery time? Anytime   Number of medications in delivery 1   Medication(s) being filled and delivered Erenumab-Aooe   Doses left of specialty medications N/A   Is there any medication that is due not being filled? No   Supplies needed? No supplies needed   Cooler needed? Yes   Do any medications need mixed or dated? No   Copay form of payment Payment plan already set up   Additional comments N/A   Questions or concerns for the pharmacist? No   Explain any questions or concerns for the pharmacist N/A   Are any medications first time fills? No   Tracking number for delivery N/A              Medication Adherence          Adherence tools used: calendar      Support network for adherence: healthcare provider   Other support network: Pharmacy             Follow-up: 30 day(s)     Sherrill Maddox  Specialty Pharmacy  Technician

## 2023-11-02 ENCOUNTER — SPECIALTY PHARMACY (OUTPATIENT)
Dept: ONCOLOGY | Facility: HOSPITAL | Age: 33
End: 2023-11-02
Payer: COMMERCIAL

## 2023-11-02 NOTE — PROGRESS NOTES
Specialty Pharmacy Refill Coordination Note     Dominga is a 33 y.o. female contacted today regarding refills of  Aimovig specialty medication(s).. patient Injection is due on 11/19    Reviewed and verified with patient:       Specialty medication(s) and dose(s) confirmed: yes    Refill Questions      Flowsheet Row Most Recent Value   Changes to allergies? No   Changes to medications? No   New conditions since last clinic visit No   Unplanned office visit, urgent care, ED, or hospital admission in the last 4 weeks  No   How does patient/caregiver feel medication is working? Very good   Financial problems or insurance changes  No   If yes, describe changes in insurance or financial issues. N/A   Since the previous refill, were any specialty medication doses or scheduled injections missed or delayed?  No   If yes, please provide the amount N/A   Why were doses missed? N/A   Does this patient require a clinical escalation to a pharmacist? No            Delivery Questions      Flowsheet Row Most Recent Value   Delivery method FedEx   Delivery address correct? Yes   Delivery phone number mobile phone   Preferred delivery time? Anytime   Number of medications in delivery 1   Medication(s) being filled and delivered Erenumab-Aooe   Doses left of specialty medications N/A   Is there any medication that is due not being filled? No   Supplies needed? No supplies needed   Cooler needed? Yes   Do any medications need mixed or dated? No   Copay form of payment Payment plan already set up   Additional comments N/A   Questions or concerns for the pharmacist? No   Explain any questions or concerns for the pharmacist N/A   Are any medications first time fills? No   Tracking number for delivery N/A              Medication Adherence          Adherence tools used: calendar      Support network for adherence: healthcare provider   Other support network: Pharmacy             Follow-up: 30 day(s)     Sherrill Maddox  Specialty Pharmacy  Technician

## 2023-11-06 ENCOUNTER — SPECIALTY PHARMACY (OUTPATIENT)
Dept: ONCOLOGY | Facility: HOSPITAL | Age: 33
End: 2023-11-06
Payer: COMMERCIAL

## 2023-11-09 NOTE — PROGRESS NOTES
Specialty Pharmacy Refill Coordination Note     Dominga is a 33 y.o. female contacted today regarding Aimovig specialty medication(s).    Aimovig was shipped signature required to pt's house, and she was not available to sign, so medications was then diverted to [facility] in [place].  Aimovig package was not available when pt stopped by to .  Reviewed and verified with patient:       Specialty medication(s) and dose(s) confirmed: yes        Delivery Questions      Flowsheet Row Most Recent Value   Delivery method FedEx   Delivery address correct? Yes   Delivery phone number mobile phone   Preferred delivery time? Anytime   Number of medications in delivery 1   Medication(s) being filled and delivered Erenumab-Aooe   Doses left of specialty medications none   Is there any medication that is due not being filled? No   Supplies needed? No supplies needed   Cooler needed? Yes   Do any medications need mixed or dated? No   Copay form of payment Payment plan already set up   Additional comments N/A   Questions or concerns for the pharmacist? No   Explain any questions or concerns for the pharmacist N/A   Are any medications first time fills? No   Tracking number for delivery N/A              Medication Adherence          Adherence tools used: calendar      Support network for adherence: healthcare provider   Other support network: Pharmacy             Follow-up: 25 day(s)     Romina Helm, CynD

## 2023-11-29 ENCOUNTER — SPECIALTY PHARMACY (OUTPATIENT)
Dept: ONCOLOGY | Facility: HOSPITAL | Age: 33
End: 2023-11-29
Payer: COMMERCIAL

## 2023-11-29 NOTE — PROGRESS NOTES
Specialty Pharmacy Refill Coordination Note     Dominga is a 33 y.o. female contacted today regarding refills of  Aimovig specialty medication(s).. patient Injection is due on 12/17    Reviewed and verified with patient:       Specialty medication(s) and dose(s) confirmed: yes    Refill Questions      Flowsheet Row Most Recent Value   Changes to allergies? No   Changes to medications? No   New conditions since last clinic visit No   Unplanned office visit, urgent care, ED, or hospital admission in the last 4 weeks  No   How does patient/caregiver feel medication is working? Very good   Financial problems or insurance changes  No   If yes, describe changes in insurance or financial issues. N/A   Since the previous refill, were any specialty medication doses or scheduled injections missed or delayed?  No   If yes, please provide the amount N/A   Why were doses missed? N/A   Does this patient require a clinical escalation to a pharmacist? No            Delivery Questions      Flowsheet Row Most Recent Value   Delivery method FedEx   Delivery address correct? Yes   Delivery phone number mobile phone   Preferred delivery time? Anytime   Number of medications in delivery 1   Medication(s) being filled and delivered Erenumab-Aooe   Doses left of specialty medications N/A   Is there any medication that is due not being filled? No   Supplies needed? No supplies needed   Cooler needed? Yes   Do any medications need mixed or dated? No   Copay form of payment Payment plan already set up   Additional comments N/A   Questions or concerns for the pharmacist? No   Explain any questions or concerns for the pharmacist N/A   Are any medications first time fills? No   Tracking number for delivery N/A              Medication Adherence          Adherence tools used: calendar      Support network for adherence: healthcare provider   Other support network: Pharmacy             Follow-up: 30 day(s)     Sherrill Maddox  Specialty Pharmacy  Technician

## 2023-12-27 ENCOUNTER — SPECIALTY PHARMACY (OUTPATIENT)
Dept: PHARMACY | Facility: TELEHEALTH | Age: 33
End: 2023-12-27
Payer: COMMERCIAL

## 2023-12-27 ENCOUNTER — SPECIALTY PHARMACY (OUTPATIENT)
Dept: ONCOLOGY | Facility: HOSPITAL | Age: 33
End: 2023-12-27
Payer: COMMERCIAL

## 2023-12-27 NOTE — PROGRESS NOTES
Specialty Pharmacy Patient Management Program  Reassessment     Dominga Suarez is a 33 y.o. female with Chronic Migraines and enrolled in the Patient Management program offered by [facility]. A follow-up outreach was conducted, including assessment of continued therapy appropriateness, medication adherence, and side effect incidence and management for Aimovig 140mg/mL.     Changes to Insurance Coverage or Financial Support  [place] Medicaid    Relevant Past Medical History and Comorbidities  Relevant medical history and concomitant health conditions were discussed with the patient. The patient's chart has been reviewed for relevant past medical history and comorbid health conditions and updated as necessary.   Past Medical History:   Diagnosis Date    Calcium kidney stones     Chronic headaches     Headache, tension-type     Herpes simplex     Migraine      Social History     Socioeconomic History    Marital status: Single   Tobacco Use    Smoking status: Every Day     Packs/day: 0.50     Years: 15.00     Additional pack years: 0.00     Total pack years: 7.50     Types: Cigarettes, Electronic Cigarette    Smokeless tobacco: Never   Vaping Use    Vaping Use: Never used   Substance and Sexual Activity    Alcohol use: Never    Drug use: Not Currently    Sexual activity: Yes     Partners: Male     Birth control/protection: Condom, Tubal ligation     Problem list reviewed by Pierre Lundberg RPH on 12/27/2023 at 12:24 PM    Allergies  Known allergies and reactions were discussed with the patient. The patient's chart has been reviewed for allergy information and updated as necessary.   No Known Allergies  Allergies reviewed by Pierre Lundberg RPH on 12/27/2023 at 12:24 PM    Relevant Laboratory Values  Lab Results   Component Value Date    GLUCOSE 101 (H) 04/10/2023    CALCIUM 8.9 04/10/2023     04/10/2023    K 4.0 04/10/2023    CO2 28.1 04/10/2023     04/10/2023    BUN 9 04/10/2023     CREATININE 0.75 04/10/2023    BCR 12.0 04/10/2023    ANIONGAP 7.9 04/10/2023     Lab Results   Component Value Date    WBC 8.75 04/10/2023    HGB 12.7 04/10/2023    HCT 38.6 04/10/2023    MCV 89.1 04/10/2023     04/10/2023     Lab Value Review  The above lab values have been reviewed; the following specialty medication(s) dose adjustment(s) are recommended: none.    Current Medication List  This medication list has been reviewed with the patient and evaluated for any interactions or necessary modifications/recommendations, and updated to include all prescription medications, OTC medications, and supplements the patient is currently taking. This list reflects what is contained in the patient's profile, which has also been marked as reviewed to communicate to other providers it is the most up to date version of the patient's current medication therapy.     Current Outpatient Medications:     acetaZOLAMIDE (DIAMOX) 500 MG capsule, Take 1 capsule by mouth 2 (Two) Times a Day., Disp: 180 capsule, Rfl: 3    acyclovir (ZOVIRAX) 400 MG tablet, Take 1 tablet by mouth 2 (two) times a day., Disp: , Rfl:     Erenumab-aooe (AIMOVIG) 140 MG/ML auto-injector, Inject 1 mL under the skin into the appropriate area as directed Every 28 (Twenty-Eight) Days., Disp: 1 mL, Rfl: 6    omeprazole (priLOSEC) 20 MG capsule, Take 1 capsule by mouth Daily., Disp: , Rfl:     ondansetron ODT (ZOFRAN-ODT) 8 MG disintegrating tablet, Place 1 tablet on the tongue Every 12 (Twelve) Hours As Needed for Nausea or Vomiting., Disp: 60 tablet, Rfl: 1    SUMAtriptan (Imitrex) 50 MG tablet, Take one tablet at onset of headache. May repeat dose one time in 2 hours if headache not relieved., Disp: 12 tablet, Rfl: 2  Medicines reviewed by Pierre Lundberg RPH on 12/27/2023 at 12:24 PM    Drug Interactions  none     Adverse Drug Reactions  Medication tolerability: Tolerating with no to minimal ADRs  Medication plan: Continue therapy with normal  follow-up  Plan for ADR Management: None    Hospitalizations and Urgent Care Since Last Assessment  Hospitalizations or Admissions: none  ED Visits: none  Urgent Office Visits: none     Adherence, Self-Administration, and Current Therapy Problems  Adherence related to the patient's specialty therapy was discussed with the patient. The Adherence segment of this outreach has been reviewed and updated.     Adherence Questions  Linked Medication(s) Assessed: Erenumab-Aooe  On average, how many doses/injections does the patient miss per month?: 0  What are the identified reasons for non-adherence or missed doses? : no problems identified  What is the estimated medication adherence level?: %  Based on the patient/caregiver response and refill history, does this patient require an MTP to track adherence improvements?: no    Additional Barriers to Patient Self-Administration: None  Methods for Supporting Patient Self-Administration: None    Open Medication Therapy Problems  No medication therapy recommendations to display    Goals of Therapy  Goals related to the patient's specialty therapy were discussed with the patient. The Patient Goals segment of this outreach has been reviewed and updated.   Goals Addressed Today        Specialty Pharmacy General Goal      Reduction in severity and frequency of migraines by 50%              Progress Toward Meeting Patient-Identified Goals of Therapy: On Track  New Patient-Identified Goals, If Applicable:     Progress Toward Meeting Clinical Goals or Therapeutic Targets: On Track  New Clinical Goals or Therapeutic Targets, If Applicable:     Quality of Life Assessment   Quality of Life related to the patient's enrollment in the patient management program and services provided was discussed with the patient. The QOL segment of this outreach has been reviewed and updated.  Quality of Life Improvement Scale: 8-Moderately better    Reassessment Plan & Follow-Up  Medication Therapy  Changes: none  Additional Plans, Therapy Recommendations, or Therapy Problems to Be Addressed: none   Pharmacist to perform regular reassessments no more than (6) months from the previous assessment.  Care Coordinator to set up future refill outreaches, coordinate prescription delivery, and escalate clinical questions to pharmacist.     Attestation  I attest the patient was actively involved in and has agreed to the above plan of care. I attest that the specialty medication(s) addressed above are appropriate for the patient based on my reassessment. If the prescribed therapy is at any point deemed not appropriate based on the current or future assessments, a consultation will be initiated with the patient's specialty care provider to determine the best course of action. The revised plan of therapy will be documented along with any required assessments and/or additional patient education provided.     Electronically signed by Pierre Lundberg RPH, 12/27/23, 12:26 PM EST.

## 2023-12-27 NOTE — PROGRESS NOTES
Specialty Pharmacy Patient Management Program  Call Center Refill Outreach      Dominga is a 33 y.o. female contacted today regarding refills of her medication(s).    Specialty medication(s) and dose(s) confirmed: Aimovig  Other medications being refilled: none    Refill Questions      Flowsheet Row Most Recent Value   Changes to allergies? No   Changes to medications? No   New conditions or infections since last clinic visit No   Unplanned office visit, urgent care, ED, or hospital admission in the last 4 weeks  No   How does patient/caregiver feel medication is working? Very good   Financial problems or insurance changes  No   If yes, describe changes in insurance or financial issues. None   Since the previous refill, were any specialty medication doses or scheduled injections missed or delayed?  No   If yes, please provide the amount None   Why were doses missed? N/A   Does this patient require a clinical escalation to a pharmacist? No            Delivery Questions      Flowsheet Row Most Recent Value   Delivery method [employer]Ex   Delivery address verified with patient/caregiver? Yes   Delivery address Home   Number of medications in delivery 1   Medication(s) being filled and delivered Erenumab-Aooe   Doses left of specialty medications 0   Copay verified? Yes   Copay amount 0   Copay form of payment No copayment ($0)            Medication Adherence          Adherence tools used: calendar      Support network for adherence: healthcare provider   Other support network: Pharmacy               Follow-up: 3 weeks     Pierre Lundberg RPH  Specialty Pharmacist  12/27/2023  12:27 EST

## 2024-01-18 ENCOUNTER — SPECIALTY PHARMACY (OUTPATIENT)
Dept: ONCOLOGY | Facility: HOSPITAL | Age: 34
End: 2024-01-18
Payer: COMMERCIAL

## 2024-01-18 NOTE — PROGRESS NOTES
Specialty Pharmacy Refill Coordination Note     Dominga is a 33 y.o. female contacted today regarding refills of Aimovig specialty medication(s).. patient Injection is due on 01/20    Reviewed and verified with patient:       Specialty medication(s) and dose(s) confirmed: yes    Refill Questions      Flowsheet Row Most Recent Value   Changes to allergies? No   Changes to medications? No   New conditions or infections since last clinic visit No   Unplanned office visit, urgent care, ED, or hospital admission in the last 4 weeks  No   How does patient/caregiver feel medication is working? Very good   Financial problems or insurance changes  No   If yes, describe changes in insurance or financial issues. N/A   Since the previous refill, were any specialty medication doses or scheduled injections missed or delayed?  No   If yes, please provide the amount N/A   Why were doses missed? N/A   Does this patient require a clinical escalation to a pharmacist? No            Delivery Questions      Flowsheet Row Most Recent Value   Delivery method FedEx   Delivery address verified with patient/caregiver? Yes   Delivery address Home   Number of medications in delivery 1   Medication(s) being filled and delivered Erenumab-Aooe   Doses left of specialty medications N/A   Copay verified? Yes   Copay amount N/A   Copay form of payment No copayment ($0)              Medication Adherence          Adherence tools used: calendar      Support network for adherence: healthcare provider   Other support network: Pharmacy             Follow-up: 30 day(s)     Sherrill Maddox  Specialty Pharmacy Technician

## 2024-02-12 ENCOUNTER — SPECIALTY PHARMACY (OUTPATIENT)
Dept: ONCOLOGY | Facility: HOSPITAL | Age: 34
End: 2024-02-12
Payer: COMMERCIAL

## 2024-03-07 ENCOUNTER — SPECIALTY PHARMACY (OUTPATIENT)
Dept: ONCOLOGY | Facility: HOSPITAL | Age: 34
End: 2024-03-07
Payer: COMMERCIAL

## 2024-03-07 NOTE — PROGRESS NOTES
Specialty Pharmacy Refill Coordination Note     Dominga is a 33 y.o. female contacted today regarding refills of Aimovig specialty medication(s).. patient Injection is due on 03/19    Reviewed and verified with patient:       Specialty medication(s) and dose(s) confirmed: yes    Refill Questions      Flowsheet Row Most Recent Value   Changes to allergies? No   Changes to medications? No   New conditions or infections since last clinic visit No   Unplanned office visit, urgent care, ED, or hospital admission in the last 4 weeks  No   How does patient/caregiver feel medication is working? Very good   Financial problems or insurance changes  No   If yes, describe changes in insurance or financial issues. N/A   Since the previous refill, were any specialty medication doses or scheduled injections missed or delayed?  No   If yes, please provide the amount N/A   Why were doses missed? N/A   Does this patient require a clinical escalation to a pharmacist? No            Delivery Questions      Flowsheet Row Most Recent Value   Delivery method [employer]Ex   Delivery address verified with patient/caregiver? Yes   Delivery address Prescriptions   Number of medications in delivery 1   Medication(s) being filled and delivered Erenumab-Aooe   Doses left of specialty medications N/A   Copay verified? Yes   Copay amount N/A   Copay form of payment No copayment ($0)              Medication Adherence    Adherence tools used: calendar  Support network for adherence: healthcare provider   Other support network: Pharmacy             Follow-up: 28 day(s)     Sherrill Maddox  Specialty Pharmacy Technician

## 2024-04-23 ENCOUNTER — SPECIALTY PHARMACY (OUTPATIENT)
Dept: ONCOLOGY | Facility: HOSPITAL | Age: 34
End: 2024-04-23
Payer: COMMERCIAL

## 2024-04-23 NOTE — PROGRESS NOTES
Specialty Pharmacy Refill Coordination Note     Dominga is a 34 y.o. female contacted today regarding refills of Aimovig specialty medication(s).  Patient is due for injection on 04/25.  Reviewed and verified with patient:       Specialty medication(s) and dose(s) confirmed: yes    Refill Questions      Flowsheet Row Most Recent Value   Changes to allergies? No   Changes to medications? No   New conditions or infections since last clinic visit No   Unplanned office visit, urgent care, ED, or hospital admission in the last 4 weeks  No   How does patient/caregiver feel medication is working? Good   Financial problems or insurance changes  No   If yes, describe changes in insurance or financial issues. N/A   Since the previous refill, were any specialty medication doses or scheduled injections missed or delayed?  No   If yes, please provide the amount N/A   Why were doses missed? N/A   Does this patient require a clinical escalation to a pharmacist? No            Delivery Questions      Flowsheet Row Most Recent Value   Delivery method FedEx   Delivery address verified with patient/caregiver? Yes   Delivery address Home   Number of medications in delivery 1   Medication(s) being filled and delivered Erenumab-Aooe   Doses left of specialty medications N/A   Copay verified? Yes   Copay amount N/A   Copay form of payment No copayment ($0)              Medication Adherence    Adherence tools used: calendar  Support network for adherence: healthcare provider   Other support network: Pharmacy             Follow-up: 28 day(s)     Sherrill Maddox  Specialty Pharmacy Technician

## 2024-05-02 NOTE — PROGRESS NOTES
Specialty Pharmacy Refill Coordination Note     Dominga is a 32 y.o. female contacted today regarding refills of  Aimovig specialty medication(s). Patient is due for injection on 4/25.      Reviewed and verified with patient:       Specialty medication(s) and dose(s) confirmed: yes    Refill Questions    Flowsheet Row Most Recent Value   Changes to allergies? No   Changes to medications? Yes  [Patient has started back on diamox 500mg BID]   New conditions since last clinic visit No   Unplanned office visit, urgent care, ED, or hospital admission in the last 4 weeks  No   How does patient/caregiver feel medication is working? Excellent   Financial problems or insurance changes  No   If yes, describe changes in insurance or financial issues. N/A   Since the previous refill, were any specialty medication doses or scheduled injections missed or delayed?  No   If yes, please provide the amount N/A   Why were doses missed? N/A   Does this patient require a clinical escalation to a pharmacist? Yes          Delivery Questions    Flowsheet Row Most Recent Value   Delivery method FedEx   Delivery address correct? Yes   Preferred delivery time? Anytime   Number of medications in delivery 1   Medication being filled and delivered Aimvoig   Doses left of specialty medications 0   Is there any medication that is due not being filled? No   Supplies needed? No supplies needed   Cooler needed? Yes   Do any medications need mixed or dated? No   Copay form of payment Payment plan already set up   Questions or concerns for the pharmacist? No   Are any medications first time fills? No            Medication Adherence    Adherence tools used: calendar  Support network for adherence: healthcare provider   Other support network: Pharmacy           Follow-up: 28 day(s)     Lisa Root, Pharmacy Technician  Specialty Pharmacy Technician      
no

## 2024-05-15 ENCOUNTER — SPECIALTY PHARMACY (OUTPATIENT)
Dept: ONCOLOGY | Facility: HOSPITAL | Age: 34
End: 2024-05-15
Payer: COMMERCIAL

## 2024-05-15 NOTE — PROGRESS NOTES
Specialty Pharmacy Refill Coordination Note     Dominga is a 34 y.o. female contacted today regarding refills of Aimovig specialty medication(s).Patient is due for injection on 05/19.    Reviewed and verified with patient:       Specialty medication(s) and dose(s) confirmed: yes    Refill Questions      Flowsheet Row Most Recent Value   Changes to allergies? No   Changes to medications? No   New conditions or infections since last clinic visit No   Unplanned office visit, urgent care, ED, or hospital admission in the last 4 weeks  No   How does patient/caregiver feel medication is working? Good   Financial problems or insurance changes  No   If yes, describe changes in insurance or financial issues. N/A   Since the previous refill, were any specialty medication doses or scheduled injections missed or delayed?  No   If yes, please provide the amount N/A   Why were doses missed? N/A   Does this patient require a clinical escalation to a pharmacist? No            Delivery Questions      Flowsheet Row Most Recent Value   Delivery method [employer]Ex   Delivery address verified with patient/caregiver? Yes   Delivery address Home   Number of medications in delivery 1   Medication(s) being filled and delivered Erenumab-Aooe   Doses left of specialty medications N/A   Copay verified? Yes   Copay amount N/A   Copay form of payment No copayment ($0)              Medication Adherence    Adherence tools used: calendar  Support network for adherence: healthcare provider   Other support network: Pharmacy             Follow-up: 28 day(s)     Sherrill Maddox  Specialty Pharmacy Technician

## 2024-06-07 ENCOUNTER — SPECIALTY PHARMACY (OUTPATIENT)
Dept: ONCOLOGY | Facility: HOSPITAL | Age: 34
End: 2024-06-07
Payer: COMMERCIAL

## 2024-06-07 NOTE — PROGRESS NOTES
Specialty Pharmacy Patient Management Program  Neurology Reassessment     Dominga Suarez is a 34 y.o. female with migraines seen by a Neurology provider and enrolled in the Neurology Patient Management program offered by [facility].  A follow-up outreach was conducted, including assessment of continued therapy appropriateness, medication adherence, and side effect incidence and management for Aimovig.     Changes to Insurance Coverage or Financial Support  [place] Medicaid     Relevant Past Medical History and Comorbidities  Relevant medical history and concomitant health conditions were discussed with the patient. The patient's chart has been reviewed for relevant past medical history and comorbid health conditions and updated as necessary.   Past Medical History:   Diagnosis Date    Calcium kidney stones     Chronic headaches     Headache, tension-type     Herpes simplex     Migraine      Social History     Socioeconomic History    Marital status: Single   Tobacco Use    Smoking status: Every Day     Current packs/day: 0.50     Average packs/day: 0.5 packs/day for 15.0 years (7.5 ttl pk-yrs)     Types: Cigarettes, Electronic Cigarette    Smokeless tobacco: Never   Vaping Use    Vaping status: Never Used   Substance and Sexual Activity    Alcohol use: Never    Drug use: Not Currently    Sexual activity: Yes     Partners: Male     Birth control/protection: Condom, Tubal ligation     Problem list reviewed by Romina Helm, PharmD on 6/7/2024 at 10:25 AM    Hospitalizations and Urgent Care Since Last Assessment  ED Visits, Admissions, or Hospitalizations: none   Urgent Office Visits: none     Allergies  Known allergies and reactions were discussed with the patient. The patient's chart has been reviewed for allergy information and updated as necessary.   No Known Allergies  Allergies reviewed by Romina Helm, PharmD on 6/7/2024 at 10:25 AM      Current Medication List  This medication  list has been reviewed with the patient and evaluated for any interactions or necessary modifications/recommendations, and updated to include all prescription medications, OTC medications, and supplements the patient is currently taking.  This list reflects what is contained in the patient's profile, which has also been marked as reviewed to communicate to other providers it is the most up to date version of the patient's current medication therapy.     Current Outpatient Medications:     acyclovir (ZOVIRAX) 400 MG tablet, Take 1 tablet by mouth 2 (two) times a day., Disp: , Rfl:     Erenumab-aooe (AIMOVIG) 140 MG/ML auto-injector, Inject 1 mL under the skin into the appropriate area as directed Every 28 (Twenty-Eight) Days., Disp: 1 mL, Rfl: 6    Medicines reviewed by Romina Helm, PharmD on 6/7/2024 at 10:10 AM  Medicines reviewed by Romina Helm, PharmD on 6/7/2024 at 10:25 AM    Drug Interactions  No relevant drug-drug interactions with [facility] specialty medications.        Adverse Drug Reactions  Medication tolerability: Tolerating with no to minimal ADRs          Medication plan: Continue therapy with normal follow-up  Plan for ADR Management: not required      Adherence, Self-Administration, and Current Therapy Problems  Adherence related patient's specialty therapy was discussed with the patient. The Adherence segment of this outreach has been reviewed and updated.   Adherence Questions  Linked Medication(s) Assessed: Erenumab-Aooe  On average, how many doses/injections does the patient miss per month?: 0  What are the identified reasons for non-adherence or missed doses? : no problems identified  What is the estimated medication adherence level?: %  Based on the patient/caregiver response and refill history, does this patient require an MTP to track adherence improvements?: no    Additional Barriers to Patient Self-Administration: none  Methods for Supporting Patient Self-Administration: pt raghavendrapt  with Aimovig self-injections.    Recently Close Medication Therapy Problems  No medication therapy recommendations to display  Open Medication Therapy Problems  No medication therapy recommendations to display     Goals of Therapy  Goals related to the patient's specialty therapy was discussed with the patient. The Patient Goals segment of this outreach has been reviewed and updated.    Goals Addressed Today        Specialty Pharmacy General Goal      Reduction in severity and frequency of migraines from baseline               Quality of Life Assessment   Quality of Life related to the patient's enrollment in the patient management program and services provided was discussed with the patient. The QOL segment of this outreach has been reviewed and updated.   Quality of Life Improvement Scale: 8-Moderately better    Reassessment Plan & Follow-Up  Medication Therapy Changes: Continue Aimovig 140 mg SubQ monthly  Related Plans, Therapy Recommendations, or Issues to Be Addressed: Last refill for Aimovig and last visit with provider was 4/10/23.  Transferred to [facility] to make f/u appt and was scheduled for 6/20/24.  Pharmacist to perform regular reassessments no more than (6) months from the previous assessment.  Care Coordinator to set up future refill outreaches, coordinate prescription delivery, and escalate clinical questions to pharmacist.     Attestation  Therapeutic appropriateness: Appropriate  I attest the patient was actively involved in and has agreed to the above plan of care. If the prescribed therapy is at any point deemed not appropriate based on the current or future assessments, a consultation will be initiated with the patient's specialty care provider to determine the best course of action. The revised plan of therapy will be documented along with any additional patient education provided. Discussed aforementioned material with patient via telemedicine.    Romina Helm, PharmD, [facility]  [facility] Specialty  Pharmacist, Neurology  6/7/2024  10:35 EDT

## 2024-06-07 NOTE — PROGRESS NOTES
Specialty Pharmacy Patient Management Program  [facility]      Dominga is a 34 y.o. female contacted today regarding refills of her medication(s).    Specialty medication(s) and dose(s) confirmed: Aimovig  Other medications being refilled: none    Refill Questions      Flowsheet Row Most Recent Value   Changes to allergies? No   Changes to medications? No   New conditions or infections since last clinic visit No   Unplanned office visit, urgent care, ED, or hospital admission in the last 4 weeks  No   How does patient/caregiver feel medication is working? Good   Financial problems or insurance changes  No   If yes, describe changes in insurance or financial issues. N/A   Since the previous refill, were any specialty medication doses or scheduled injections missed or delayed?  No   If yes, please provide the amount N/A   Why were doses missed? N/A   Does this patient require a clinical escalation to a pharmacist? No            Delivery Questions      Flowsheet Row Most Recent Value   Delivery method [employer]Ex   Delivery address verified with patient/caregiver? Yes   Delivery address Home   Number of medications in delivery 1   Medication(s) being filled and delivered Erenumab-Aooe   Doses left of specialty medications N/A   Copay verified? Yes   Copay amount Aimovig co-pay $0   Copay form of payment No copayment ($0)            Medication Adherence    Adherence tools used: calendar  Support network for adherence: healthcare provider   Other support network: Pharmacy               Follow-up: 28 days     Romina Helm PharmD  Specialty Pharmacist  6/7/2024  10:34 EDT

## 2024-07-11 ENCOUNTER — SPECIALTY PHARMACY (OUTPATIENT)
Dept: ONCOLOGY | Facility: HOSPITAL | Age: 34
End: 2024-07-11
Payer: COMMERCIAL

## 2024-07-11 NOTE — PROGRESS NOTES
Specialty Pharmacy Refill Coordination Note     Dominga is a 34 y.o. female contacted today regarding refills of Emgality specialty medication(s)..Patient is due for injection on 07/16.    Reviewed and verified with patient:       Specialty medication(s) and dose(s) confirmed: yes    Refill Questions      Flowsheet Row Most Recent Value   Changes to allergies? No   Changes to medications? No   New conditions or infections since last clinic visit No   Unplanned office visit, urgent care, ED, or hospital admission in the last 4 weeks  No   How does patient/caregiver feel medication is working? Good   Financial problems or insurance changes  No   If yes, describe changes in insurance or financial issues. N/A   Since the previous refill, were any specialty medication doses or scheduled injections missed or delayed?  No   If yes, please provide the amount N/A   Why were doses missed? N/A   Does this patient require a clinical escalation to a pharmacist? No            Delivery Questions      Flowsheet Row Most Recent Value   Delivery method [employer]Ex   Delivery address verified with patient/caregiver? Yes   Delivery address Prescription   Number of medications in delivery 1   Medication(s) being filled and delivered Erenumab-Aooe   Doses left of specialty medications N/A   Copay verified? Yes   Copay amount N/A   Copay form of payment No copayment ($0)   Ship Date 07/11   Delivery Date 07/12   Signature Required Yes              Medication Adherence    Adherence tools used: calendar  Support network for adherence: healthcare provider   Other support network: Pharmacy             Follow-up: 28 day(s)     Sherrill Maddox  Specialty Pharmacy Technician

## 2024-08-14 ENCOUNTER — SPECIALTY PHARMACY (OUTPATIENT)
Dept: ONCOLOGY | Facility: HOSPITAL | Age: 34
End: 2024-08-14
Payer: COMMERCIAL

## 2024-08-14 NOTE — PROGRESS NOTES
Specialty Pharmacy Refill Coordination Note     Dominga is a 34 y.o. female contacted today regarding refills of Aimovig specialty medication(s).Patient is due for injection on 08/18.    Reviewed and verified with patient:       Specialty medication(s) and dose(s) confirmed: yes    Refill Questions      Flowsheet Row Most Recent Value   Changes to allergies? No   Changes to medications? No   New conditions or infections since last clinic visit No   Unplanned office visit, urgent care, ED, or hospital admission in the last 4 weeks  No   How does patient/caregiver feel medication is working? Good   Financial problems or insurance changes  No   Since the previous refill, were any specialty medication doses or scheduled injections missed or delayed?  No   Does this patient require a clinical escalation to a pharmacist? No            Delivery Questions      Flowsheet Row Most Recent Value   Delivery method [employer]Ex   Delivery address verified with patient/caregiver? Yes   Delivery address Home   Number of medications in delivery 1   Medication(s) being filled and delivered Erenumab-Aooe   Doses left of specialty medications N/A   Copay verified? Yes   Copay amount Aimovig =$0   Copay form of payment No copayment ($0)   Ship Date 08/15   Delivery Date 08/16   Signature Required Yes              Medication Adherence    Adherence tools used: calendar  Support network for adherence: healthcare provider   Other support network: Pharmacy             Follow-up: 28 day(s)     Sherrill Maddox  Specialty Pharmacy Technician

## 2024-08-20 ENCOUNTER — OFFICE VISIT (OUTPATIENT)
Dept: NEUROLOGY | Facility: CLINIC | Age: 34
End: 2024-08-20
Payer: COMMERCIAL

## 2024-08-20 VITALS
HEIGHT: 62 IN | HEART RATE: 66 BPM | BODY MASS INDEX: 26.83 KG/M2 | WEIGHT: 145.8 LBS | DIASTOLIC BLOOD PRESSURE: 62 MMHG | OXYGEN SATURATION: 98 % | SYSTOLIC BLOOD PRESSURE: 108 MMHG

## 2024-08-20 DIAGNOSIS — G25.0 BENIGN ESSENTIAL TREMOR: ICD-10-CM

## 2024-08-20 DIAGNOSIS — G93.2 IIH (IDIOPATHIC INTRACRANIAL HYPERTENSION): ICD-10-CM

## 2024-08-20 DIAGNOSIS — R20.2 PARESTHESIA OF BOTH HANDS: ICD-10-CM

## 2024-08-20 DIAGNOSIS — R11.0 NAUSEA: ICD-10-CM

## 2024-08-20 DIAGNOSIS — G43.709 CHRONIC MIGRAINE WITHOUT AURA WITHOUT STATUS MIGRAINOSUS, NOT INTRACTABLE: Primary | ICD-10-CM

## 2024-08-20 PROCEDURE — 99214 OFFICE O/P EST MOD 30 MIN: CPT | Performed by: NURSE PRACTITIONER

## 2024-08-20 PROCEDURE — 1160F RVW MEDS BY RX/DR IN RCRD: CPT | Performed by: NURSE PRACTITIONER

## 2024-08-20 PROCEDURE — 1159F MED LIST DOCD IN RCRD: CPT | Performed by: NURSE PRACTITIONER

## 2024-08-20 RX ORDER — MULTIPLE VITAMINS W/ MINERALS TAB 9MG-400MCG
1 TAB ORAL DAILY
COMMUNITY

## 2024-08-20 RX ORDER — OXCARBAZEPINE 150 MG/1
150 TABLET, FILM COATED ORAL 2 TIMES DAILY
Qty: 60 TABLET | Refills: 5 | Status: SHIPPED | OUTPATIENT
Start: 2024-08-20

## 2024-08-20 RX ORDER — ONDANSETRON 4 MG/1
4 TABLET, FILM COATED ORAL DAILY PRN
Qty: 30 TABLET | Refills: 1 | Status: SHIPPED | OUTPATIENT
Start: 2024-08-20

## 2024-08-20 NOTE — PROGRESS NOTES
Neuro Office Visit      Encounter Date: 2024   Patient Name: Dominga Suarez  : 1990   MRN: [id number]   PCP: Miguel Angel Seo  Chief Complaint:    Chief Complaint   Patient presents with    Migraine       History of Present Illness: Dominga Suarez is a 34 y.o. female who is here today in Neurology for  headaches, IIH, numbness      Last visit 4/10/2023-cont aimovig, samples of nurtec and ubrelvy, resume diamox, check labs.  History of Present Illness       Interval history-pt lost to follow up. States at her last eye exam was told she was fine. Has not been taking Diamox.      IIH  Ran out of Diamox.  Due for eye exam.  Eye examOPHTHALMOLOGY - SCAN - F/U, [facility], 2022 (2022)-still with mild papiledema  Post-Procedure Note by Prabha Barnard PA (2022 07:45)-opening pressure 22cm H20.  Patient is down to 155 pounds and does not wish to lose more weight.     Diamox makes her feel foggy-not allergy, but side effect.     PH  Lost 89 pounds after gastric sleeve  Optic nerve edema on exam  Opening pressure 31 cm H20  MRI nml per pt report     Migraine Headaches  Pt is doing well on Aimovig. Having about 8 headaches a month. 3 are severe.  No side effects.  Using Aimovig which is effective.  Wears off last week. Using imitrex but not effective. Did not  samples of Nurtec.     Days per month: 8  Location:top of head, temples, occipital     Quality: Aching and pressure     Duration: hours to 1 day  Severity: 6/10  Triggers: none  Associated Symptoms: Nausea, Vomiting, Photophobia, Phonophobia,  Vision changes blurred and Tinnitus  Aura: none     Abortives: IBU, excedrin  Preventives: TPM, SSRI, BB contraindicated due to dizziness and low blood sugar. Aimovig            Smoking Cessation  Down to a 1/3 pack  Chantix and nicotine lozenges. She didn't use chantix. Afraid of side effects.     Paresthesia   1 year ago developed LUE and LLE numbness with pins and needles. Can  be burning. It is positional. Does not drink. She is smoking. History of gastric bypass.  No weakness. Works as a .     MRI brain reviewed without lesions.     PMH: Gastric sleeve April 2021, renal stones                                                          Subjective      Past Medical History:   Past Medical History:   Diagnosis Date    Calcium kidney stones     Chronic headaches     Headache, tension-type     Herpes simplex     Migraine        Past Surgical History:   Past Surgical History:   Procedure Laterality Date    DILATATION AND CURETTAGE      GASTRIC SLEEVE LAPAROSCOPIC  04/20/2021    KIDNEY STONE SURGERY      LUMBAR PUNCTURE  09/06/2022    TUBAL ABDOMINAL LIGATION      WRIST SURGERY         Family History:   Family History   Problem Relation Age of Onset    Breast cancer Maternal Grandmother     Migraines Maternal Grandmother     Colon cancer Paternal Grandfather        Social History:   Social History     Socioeconomic History    Marital status: Single   Tobacco Use    Smoking status: Every Day     Current packs/day: 0.50     Average packs/day: 0.5 packs/day for 15.0 years (7.5 ttl pk-yrs)     Types: Cigarettes, Electronic Cigarette    Smokeless tobacco: Never   Vaping Use    Vaping status: Never Used   Substance and Sexual Activity    Alcohol use: Never    Drug use: Not Currently    Sexual activity: Yes     Partners: Male     Birth control/protection: Condom, Tubal ligation       Medications:     Current Outpatient Medications:     acyclovir (ZOVIRAX) 400 MG tablet, Take 1 tablet by mouth 2 (two) times a day., Disp: , Rfl:     Erenumab-aooe (AIMOVIG) 140 MG/ML auto-injector, Inject 1 mL under the skin into the appropriate area as directed Every 28 (Twenty-Eight) Days., Disp: 1 mL, Rfl: 1    multivitamin with minerals tablet tablet, Take 1 tablet by mouth Daily., Disp: , Rfl:     ondansetron (Zofran) 4 MG tablet, Take 1 tablet by mouth Daily As Needed for Nausea or Vomiting., Disp: 30 tablet,  Rfl: 1    OXcarbazepine (TRILEPTAL) 150 MG tablet, Take 1 tablet by mouth 2 (Two) Times a Day., Disp: 60 tablet, Rfl: 5    Rimegepant Sulfate (NURTEC) 75 MG tablet dispersible tablet, Take 1 tablet by mouth Daily As Needed (ha)., Disp: 4 tablet, Rfl: 0    ubrogepant (Ubrelvy) 100 MG tablet, Take 1 tablet by mouth 1 (One) Time As Needed (ha) for up to 2 doses., Disp: 2 tablet, Rfl: 0    Allergies:   No Known Allergies    PHQ-9 Total Score:     [facility] has not been completed.    Objective     Physical Exam:   Physical Exam  Neurological:      Mental Status: She is oriented to person, place, and time.      Gait: Gait is intact.      Deep Tendon Reflexes:      Reflex Scores:       Patellar reflexes are 2+ on the left side.       Achilles reflexes are 2+ on the right side and 2+ on the left side.  Psychiatric:         Speech: Speech normal.         Neurologic Exam     Mental Status   Oriented to person, place, and time.   Follows 3 step commands.   Attention: normal. Concentration: normal.   Speech: speech is normal   Level of consciousness: alert  Knowledge: consistent with education.   Normal comprehension.     Cranial Nerves     CN III, IV, VI   Nystagmus: none   Diplopia: none  Upgaze: normal  Downgaze: normal  Conjugate gaze: present    CN VII   Facial expression full, symmetric.     CN VIII   Hearing: intact    Motor Exam   Muscle bulk: normal  Overall muscle tone: normal    Strength   Right biceps: 5/5  Left biceps: 5/5  Right triceps: 5/5  Left triceps: 5/5  Right interossei: 5/5  Left interossei: 5/5  Right quadriceps: 5/5  Left quadriceps: 5/5  Right anterior tibial: 5/5  Left anterior tibial: 5/5  Right posterior tibial: 5/5  Left posterior tibial: 5/5    Sensory Exam   Light touch normal.     Gait, Coordination, and Reflexes     Gait  Gait: normal    Tremor   Resting tremor: absent  Action tremor: absent    Reflexes   Left patellar: 2+  Right achilles: 2+  Left achilles: 2+  Right :  "2+  Left : 2+     Physical Exam        Vital Signs:   Vitals:    08/20/24 0922   BP: 108/62   Pulse: 66   SpO2: 98%   Weight: 66.1 kg (145 lb 12.8 oz)   Height: 157.5 cm (62\")     Body mass index is 26.67 kg/m².         Assessment / Plan      Assessment/Plan:   Diagnoses and all orders for this visit:    1. Chronic migraine without aura without status migrainosus, not intractable (Primary)  Comments:  Cont Aimovig. Samples of nurtec and ubrelvy  Orders:  -     OXcarbazepine (TRILEPTAL) 150 MG tablet; Take 1 tablet by mouth 2 (Two) Times a Day.  Dispense: 60 tablet; Refill: 5  -     Rimegepant Sulfate (NURTEC) 75 MG tablet dispersible tablet; Take 1 tablet by mouth Daily As Needed (ha).  Dispense: 4 tablet; Refill: 0  -     ubrogepant (Ubrelvy) 100 MG tablet; Take 1 tablet by mouth 1 (One) Time As Needed (ha) for up to 2 doses.  Dispense: 2 tablet; Refill: 0  -     ondansetron (Zofran) 4 MG tablet; Take 1 tablet by mouth Daily As Needed for Nausea or Vomiting.  Dispense: 30 tablet; Refill: 1    2. IIH (idiopathic intracranial hypertension)  Comments:  Pt to have eye exam. If papilledema is present will start back on Diamox    3. Benign essential tremor    4. Paresthesia of both hands  -     EMG & Nerve Conduction Test; Future    5. Nausea  -     ondansetron (Zofran) 4 MG tablet; Take 1 tablet by mouth Daily As Needed for Nausea or Vomiting.  Dispense: 30 tablet; Refill: 1       Assessment & Plan          Patient Education:       Reviewed medications, potential side effects and signs and symptoms to report. Discussed risk versus benefits of treatment plan with patient and/or family-including medications, labs and radiology that may be ordered. Addressed questions and concerns during visit. Patient and/or family verbalized understanding and agree with plan. Instructed to call the office with any questions and report to ER with any life-threatening symptoms.     Follow Up:   Return in about 3 months (around " 11/20/2024) for Recheck.    During this visit the following were done:  Labs Reviewed [x]    Labs Ordered []    Radiology Reports Reviewed [x]    Radiology Ordered []    PCP Records Reviewed [x]    Referring Provider Records Reviewed []    ER Records Reviewed []    Hospital Records Reviewed []    History Obtained From Family []    Radiology Images Reviewed []    Other Reviewed [x]    Records Requested []      Patient or patient representative verbalized consent for the use of Ambient Listening during the visit with  Boston Anguiano DNP, APRN for chart documentation. 8/20/2024  09:51 EDT      Boston Anguiano DNP, APRN

## 2024-08-20 NOTE — LETTER
2024     Miguel Angel Seo MD  [address] [id number]    Patient: Dominga Suarez   YOB: 1990   Date of Visit: 2024     Dear Miguel Angel Seo MD:       Thank you for referring Dominga Suarez to me for evaluation. Below are the relevant portions of my assessment and plan of care.    If you have questions, please do not hesitate to call me. I look forward to following Dominga along with you.         Sincerely,        Boston Anguiano DNP, APRN        CC: No Recipients    Boston Anguiano DNP, APRN  [phone]  Signed     Neuro Office Visit      Encounter Date: 2024   Patient Name: Dominga Suarez  : 1990   MRN: [id number]   PCP: Miguel Angel Seo  Chief Complaint:    Chief Complaint   Patient presents with   • Migraine       History of Present Illness: Dominga Suarez is a 34 y.o. female who is here today in Neurology for  headaches, IIH, numbness      Last visit 4/10/2023-cont aimovig, samples of nurtec and ubrelvy, resume diamox, check labs.  History of Present Illness       Interval history-pt lost to follow up. States at her last eye exam was told she was fine. Has not been taking Diamox.      IIH  Ran out of Diamox.  Due for eye exam.  Eye examOPHTHALMOLOGY - SCAN - F/U, [facility], 2022 (2022)-still with mild papiledema  Post-Procedure Note by Prabha Barnard PA (2022 07:45)-opening pressure 22cm H20.  Patient is down to 155 pounds and does not wish to lose more weight.     Diamox makes her feel foggy-not allergy, but side effect.     PH  Lost 89 pounds after gastric sleeve  Optic nerve edema on exam  Opening pressure 31 cm H20  MRI nml per pt report     Migraine Headaches  Pt is doing well on Aimovig. Having about 8 headaches a month. 3 are severe.  No side effects.  Using Aimovig which is effective.  Wears off last week. Using imitrex but not effective. Did not  samples of Nurtec.      Days per month: 8  Location:top of head, temples, occipital     Quality: Aching and pressure     Duration: hours to 1 day  Severity: 6/10  Triggers: none  Associated Symptoms: Nausea, Vomiting, Photophobia, Phonophobia,  Vision changes blurred and Tinnitus  Aura: none     Abortives: IBU, excedrin  Preventives: TPM, SSRI, BB contraindicated due to dizziness and low blood sugar. Aimovig            Smoking Cessation  Down to a 1/3 pack  Chantix and nicotine lozenges. She didn't use chantix. Afraid of side effects.     Paresthesia   1 year ago developed LUE and LLE numbness with pins and needles. Can be burning. It is positional. Does not drink. She is smoking. History of gastric bypass.  No weakness. Works as a .     MRI brain reviewed without lesions.     PMH: Gastric sleeve April 2021, renal stones                                                          Subjective      Past Medical History:   Past Medical History:   Diagnosis Date   • Calcium kidney stones    • Chronic headaches    • Headache, tension-type    • Herpes simplex    • Migraine        Past Surgical History:   Past Surgical History:   Procedure Laterality Date   • DILATATION AND CURETTAGE     • GASTRIC SLEEVE LAPAROSCOPIC  04/20/2021   • KIDNEY STONE SURGERY     • LUMBAR PUNCTURE  09/06/2022   • TUBAL ABDOMINAL LIGATION     • WRIST SURGERY         Family History:   Family History   Problem Relation Age of Onset   • Breast cancer Maternal Grandmother    • Migraines Maternal Grandmother    • Colon cancer Paternal Grandfather        Social History:   Social History     Socioeconomic History   • Marital status: Single   Tobacco Use   • Smoking status: Every Day     Current packs/day: 0.50     Average packs/day: 0.5 packs/day for 15.0 years (7.5 ttl pk-yrs)     Types: Cigarettes, Electronic Cigarette   • Smokeless tobacco: Never   Vaping Use   • Vaping status: Never Used   Substance and Sexual Activity   • Alcohol use: Never   • Drug use: Not Currently    • Sexual activity: Yes     Partners: Male     Birth control/protection: Condom, Tubal ligation       Medications:     Current Outpatient Medications:   •  acyclovir (ZOVIRAX) 400 MG tablet, Take 1 tablet by mouth 2 (two) times a day., Disp: , Rfl:   •  Erenumab-aooe (AIMOVIG) 140 MG/ML auto-injector, Inject 1 mL under the skin into the appropriate area as directed Every 28 (Twenty-Eight) Days., Disp: 1 mL, Rfl: 1  •  multivitamin with minerals tablet tablet, Take 1 tablet by mouth Daily., Disp: , Rfl:   •  ondansetron (Zofran) 4 MG tablet, Take 1 tablet by mouth Daily As Needed for Nausea or Vomiting., Disp: 30 tablet, Rfl: 1  •  OXcarbazepine (TRILEPTAL) 150 MG tablet, Take 1 tablet by mouth 2 (Two) Times a Day., Disp: 60 tablet, Rfl: 5  •  Rimegepant Sulfate (NURTEC) 75 MG tablet dispersible tablet, Take 1 tablet by mouth Daily As Needed (ha)., Disp: 4 tablet, Rfl: 0  •  ubrogepant (Ubrelvy) 100 MG tablet, Take 1 tablet by mouth 1 (One) Time As Needed (ha) for up to 2 doses., Disp: 2 tablet, Rfl: 0    Allergies:   No Known Allergies    PHQ-9 Total Score:     STEADI Fall Risk Assessment has not been completed.    Objective     Physical Exam:   Physical Exam  Neurological:      Mental Status: She is oriented to person, place, and time.      Gait: Gait is intact.      Deep Tendon Reflexes:      Reflex Scores:       Patellar reflexes are 2+ on the left side.       Achilles reflexes are 2+ on the right side and 2+ on the left side.  Psychiatric:         Speech: Speech normal.         Neurologic Exam     Mental Status   Oriented to person, place, and time.   Follows 3 step commands.   Attention: normal. Concentration: normal.   Speech: speech is normal   Level of consciousness: alert  Knowledge: consistent with education.   Normal comprehension.     Cranial Nerves     CN III, IV, VI   Nystagmus: none   Diplopia: none  Upgaze: normal  Downgaze: normal  Conjugate gaze: present    CN VII   Facial expression full,  "symmetric.     CN VIII   Hearing: intact    Motor Exam   Muscle bulk: normal  Overall muscle tone: normal    Strength   Right biceps: 5/5  Left biceps: 5/5  Right triceps: 5/5  Left triceps: 5/5  Right interossei: 5/5  Left interossei: 5/5  Right quadriceps: 5/5  Left quadriceps: 5/5  Right anterior tibial: 5/5  Left anterior tibial: 5/5  Right posterior tibial: 5/5  Left posterior tibial: 5/5    Sensory Exam   Light touch normal.     Gait, Coordination, and Reflexes     Gait  Gait: normal    Tremor   Resting tremor: absent  Action tremor: absent    Reflexes   Left patellar: 2+  Right achilles: 2+  Left achilles: 2+  Right : 2+  Left : 2+     Physical Exam        Vital Signs:   Vitals:    08/20/24 0922   BP: 108/62   Pulse: 66   SpO2: 98%   Weight: 66.1 kg (145 lb 12.8 oz)   Height: 157.5 cm (62\")     Body mass index is 26.67 kg/m².         Assessment / Plan      Assessment/Plan:   Diagnoses and all orders for this visit:    1. Chronic migraine without aura without status migrainosus, not intractable (Primary)  Comments:  Cont Aimovig. Samples of nurtec and ubrelvy  Orders:  -     OXcarbazepine (TRILEPTAL) 150 MG tablet; Take 1 tablet by mouth 2 (Two) Times a Day.  Dispense: 60 tablet; Refill: 5  -     Rimegepant Sulfate (NURTEC) 75 MG tablet dispersible tablet; Take 1 tablet by mouth Daily As Needed (ha).  Dispense: 4 tablet; Refill: 0  -     ubrogepant (Ubrelvy) 100 MG tablet; Take 1 tablet by mouth 1 (One) Time As Needed (ha) for up to 2 doses.  Dispense: 2 tablet; Refill: 0  -     ondansetron (Zofran) 4 MG tablet; Take 1 tablet by mouth Daily As Needed for Nausea or Vomiting.  Dispense: 30 tablet; Refill: 1    2. IIH (idiopathic intracranial hypertension)  Comments:  Pt to have eye exam. If papilledema is present will start back on Diamox    3. Benign essential tremor    4. Paresthesia of both hands  -     EMG & Nerve Conduction Test; Future    5. Nausea  -     ondansetron (Zofran) 4 MG tablet; Take 1 " tablet by mouth Daily As Needed for Nausea or Vomiting.  Dispense: 30 tablet; Refill: 1       Assessment & Plan          Patient Education:       Reviewed medications, potential side effects and signs and symptoms to report. Discussed risk versus benefits of treatment plan with patient and/or family-including medications, labs and radiology that may be ordered. Addressed questions and concerns during visit. Patient and/or family verbalized understanding and agree with plan. Instructed to call the office with any questions and report to ER with any life-threatening symptoms.     Follow Up:   Return in about 3 months (around 11/20/2024) for Recheck.    During this visit the following were done:  Labs Reviewed [x]    Labs Ordered []    Radiology Reports Reviewed [x]    Radiology Ordered []    PCP Records Reviewed [x]    Referring Provider Records Reviewed []    ER Records Reviewed []    Hospital Records Reviewed []    History Obtained From Family []    Radiology Images Reviewed []    Other Reviewed [x]    Records Requested []      Patient or patient representative verbalized consent for the use of Ambient Listening during the visit with  Boston Anguiano DNP, APRN for chart documentation. 8/20/2024  09:51 EDT      Boston Anguiano DNP, APRN

## 2024-09-03 ENCOUNTER — SPECIALTY PHARMACY (OUTPATIENT)
Dept: ONCOLOGY | Facility: HOSPITAL | Age: 34
End: 2024-09-03
Payer: COMMERCIAL

## 2024-09-03 RX ORDER — RIMEGEPANT SULFATE 75 MG/75MG
75 TABLET, ORALLY DISINTEGRATING ORAL DAILY PRN
Qty: 16 TABLET | Refills: 11 | Status: SHIPPED | OUTPATIENT
Start: 2024-09-03 | End: 2024-09-05 | Stop reason: SDUPTHER

## 2024-09-05 ENCOUNTER — SPECIALTY PHARMACY (OUTPATIENT)
Dept: ONCOLOGY | Facility: HOSPITAL | Age: 34
End: 2024-09-05
Payer: COMMERCIAL

## 2024-09-05 RX ORDER — RIMEGEPANT SULFATE 75 MG/75MG
75 TABLET, ORALLY DISINTEGRATING ORAL DAILY PRN
Qty: 16 TABLET | Refills: 11 | Status: SHIPPED | OUTPATIENT
Start: 2024-09-05

## 2024-09-05 NOTE — PROGRESS NOTES
Specialty Pharmacy Patient Management Program  Neurology Medication Addition Assessment     Dominga Suarze is a 34 y.o. female with migraines seen by a Neurology provider and enrolled in the Neurology Patient Management program offered by [facility].  This assessment was conducted as a result of a specialty medication addition or substitution. The patient was previously introduced to services offered by [facility], including: regular assessments, refill coordination, curbside pick-up or mail order delivery options, prior authorization maintenance, and financial assistance programs as applicable. The patient was also provided with contact information for the pharmacy team.     An initial outreach was conducted, including assessment of therapy appropriateness and specialty medication education for Nurtec.    A follow-up outreach was conducted, including assessment of continued therapy appropriateness, medication adherence, and side effect incidence and management for Aimovig.    Insurance Coverage & Financial Support  [place] Medicaid     Relevant Past Medical History and Comorbidities  Relevant medical history and concomitant health conditions were discussed with the patient. The patient's chart has been reviewed for relevant past medical history and comorbid health conditions and updated as necessary.   Past Medical History:   Diagnosis Date    Calcium kidney stones     Chronic headaches     Headache, tension-type     Herpes simplex     Migraine      Social History     Socioeconomic History    Marital status: Single   Tobacco Use    Smoking status: Every Day     Current packs/day: 0.50     Average packs/day: 0.5 packs/day for 15.0 years (7.5 ttl pk-yrs)     Types: Cigarettes, Electronic Cigarette    Smokeless tobacco: Never   Vaping Use    Vaping status: Never Used   Substance and Sexual Activity    Alcohol use: Never    Drug use: Not Currently    Sexual activity:  LM for pt to call the office. Yes     Partners: Male     Birth control/protection: Condom, Tubal ligation     Problem list reviewed by Romina Helm PharmD on 9/5/2024 at 10:18 AM    Hospitalizations and Urgent Care Since Last Assessment  ED Visits, Admissions, or Hospitalizations: none   Urgent Office Visits: none     Allergies  Known allergies and reactions were discussed with the patient. The patient's chart has been reviewed for  allergy information and updated as necessary.   No Known Allergies  Allergies reviewed by Romina Helm PharmD on 9/5/2024 at 10:18 AM    Relevant Laboratory Values  Common labs          3/15/2024    14:55   Common Labs   WBC 10.76       Hemoglobin 12.9       Hematocrit 39.6       Platelets 222          Details          This result is from an external source.                 Current Medication List  This medication list has been reviewed with the patient and evaluated for any interactions or necessary modifications/recommendations, and updated to include all prescription medications, OTC medications, and supplements the patient is currently taking.  This list reflects what is contained in the patient's profile, which has also been marked as reviewed to communicate to other providers it is the most up to date version of the patient's current medication therapy.     Current Outpatient Medications:     Rimegepant Sulfate (Nurtec) 75 MG tablet dispersible tablet, Take 1 tablet by mouth Daily As Needed (migraines). Take 1 tablet at the onset of headache, Max of 75 mg/24 hours, Max of 18 tabs/30 days., Disp: 16 tablet, Rfl: 11    acyclovir (ZOVIRAX) 400 MG tablet, Take 1 tablet by mouth 2 (two) times a day., Disp: , Rfl:     Erenumab-aooe (AIMOVIG) 140 MG/ML auto-injector, Inject 1 mL under the skin into the appropriate area as directed Every 28 (Twenty-Eight) Days., Disp: 1 mL, Rfl: 1    multivitamin with minerals tablet tablet, Take 1 tablet by mouth Daily., Disp: , Rfl:     ondansetron (Zofran) 4 MG tablet, Take  1 tablet by mouth Daily As Needed for Nausea or Vomiting., Disp: 30 tablet, Rfl: 1    OXcarbazepine (TRILEPTAL) 150 MG tablet, Take 1 tablet by mouth 2 (Two) Times a Day., Disp: 60 tablet, Rfl: 5    Medicines reviewed by Romina Helm, PharmD on 9/5/2024 at 10:18 AM    Drug Interactions  No relevant drug-drug interactions with specialty medication(s):  Nurtec and Aimovig.        Initial Education Provided for Specialty Medication  The patient has been provided with the following education and any applicable administration techniques (i.e. self-injection) have been demonstrated for the therapies indicated. All questions and concerns have been addressed prior to the patient receiving the medication, and the patient has verbalized comprehension of the education and any materials provided.  Additional patient education shall be provided and documented upon request by the patient, provider or payer.      Nurtec (rimegepant)  Medication Expectations   Why am I taking this medication? You are taking this medication for migraine prophylaxis or to treat an acute migraine.   What should I expect while on this medication? You should expect to see a decrease in the frequency and severity of your migraines.   How does the medication work? Nurtec is a monoclonal antibody that binds to calcitonin gene-related peptide (CGRP) and blocks its binding to the receptor decreasing the severity of migraines.   How long will I be on this medication for? The amount of time you will be on this medication will be determined by your doctor and your response to the medication.    How do I take this medication? Take as directed on your prescription label.   What are some possible side effects? Potential side effects including, but not limited to nausea. Pt verbalized understanding.   What happens if I miss a dose? Take the missed dose as soon as possible, and resume the every other day timed from the last dose..     Medication Safety   What are  things I should warn my doctor immediately about? Hypersensitivity reactions - trouble breathing or swallowing.   What are things that I should be cautious of? Hypersensitivity reactions (eg, dyspnea, rash), including delayed serious reactions, have occurred; discontinue use if suspected    What are some medications that can interact with this one? Avoid concomitant administration of Nurtec ODT with strong inhibitors of CY, strong or moderate inducers of CYP3A or inhibitors of P-gp or BCRP. Avoid another dose of Nurtec ODT within 48 hours when it is administered with moderate inhibitors of CY.  Ask your pharmacist or health care provider before starting new medications     Medication Storage/Handling   How should I handle this medication? Keep this medication out of reach of pets/children in original container. Ensure hands are dry before opening blister pack.   How does this medication need to be stored? Store at room temperature away from heat/cold, sunlight or moisture   How should I dispose of this medication? There should not be a need to dispose of this medication unless your provider decides to change the dose or therapy. If that is the case, take to your local police station for proper disposal. Some pharmacies also have take-back bins for medication drop-off.      Resources/Support   How can I remind myself to take this medication? You can download reminder apps to help you manage your refills. You may also set an alarm on your phone to remind you. The pharmacy carries pill boxes that you can place next to an area you pass everyday (such as where you place your car keys or where you charge your phone)   Is financial support available?  Yes, [employer] can provide co-pay cards if you have commercial insurance or patient assistance if you have Medicare or no insurance.    Which vaccines are recommended for me? Talk to your doctor about these vaccines: Flu, Coronavirus (COVID-19),  Pneumococcal (pneumonia), Tdap, Hepatitis B, Zoster (shingles)          Adherence, Self-Administration, and Current Therapy Problems  Adherence related to the patient's specialty therapy was discussed with the patient. The Adherence segment of this outreach has been reviewed and updated.     Is there a concern with patient's ability to self administer the medication correctly and without issue?: No  Were any potential barriers to adherence identified during the initial assessment or patient education?: No  Are there any concerns regarding the patient's understanding of the importance of medication adherence?: No    Adherence Questions  Linked Medication(s) Assessed: Erenumab-Aooe  On average, how many doses/injections does the patient miss per month?: 0  What are the identified reasons for non-adherence or missed doses? : no problems identified  What is the estimated medication adherence level?: %  Based on the patient/caregiver response and refill history, does this patient require an MTP to track adherence improvements?: no    Additional Barriers to Patient Self-Administration: none  Methods for Supporting Patient Self-Administration: pt adept with Aimovig self-injections    Recently Close Medication Therapy Problems  No medication therapy recommendations to display  Open Medication Therapy Problems  No medication therapy recommendations to display     Adverse Drug Reactions  Medication tolerability: Tolerating with no to minimal ADRs          Medication plan: Continue therapy with normal follow-up  Plan for ADR Management: not required    Goals of Therapy  Goals related to the patient's specialty therapy were discussed with the patient. The Patient Goals segment of this outreach has been reviewed and updated.   Goals Addressed Today        Specialty Pharmacy General Goal      Reduction in severity and frequency of migraines from baseline               Quality of Life Assessment   Quality of Life related to  the patient's enrollment in the patient management program and services provided was discussed with the patient. The QOL segment of this outreach has been reviewed and updated.   Quality of Life Improvement Scale: 8-Moderately better    Reassessment Plan & Follow-Up  Medication Therapy Changes: Start Nurtec 75 mg po once daily as needed for migraines. - Take 1 tablet at the onset of headache, Max of 75 mg/24 hours, Max of 18 tabs/30 days, and continue Aimovig 140 mg subq monthly.  Related Plans, Therapy Recommendations, or Issues to Be Addressed: none   Pharmacist to perform regular reassessments no more than (6) months from the previous assessment.  Care Coordinator to set up future refill outreaches, coordinate prescription delivery, and escalate clinical questions to pharmacist.     Attestation  Therapeutic appropriateness: Appropriate  I attest the patient was actively involved in and has agreed to the above plan of care. If the prescribed therapy is at any point deemed not appropriate based on the current or future assessments, a consultation will be initiated with the patient's specialty care provider to determine the best course of action. The revised plan of therapy will be documented along with any additional patient education provided. Discussed aforementioned material with patient via telemedicine.    Romina Helm, PharmD, [facility]  Clinic Specialty Pharmacist, Neurology  9/5/2024  10:21 EDT

## 2024-09-05 NOTE — PROGRESS NOTES
Specialty Pharmacy Refill Coordination Note     Dominga is a 34 y.o. female contacted today regarding refills of  Aimovig and Nurtec specialty medication(s). Patient is due for injection on 9/15.      Reviewed and verified with patient:  Allergies  Meds  Problems       Specialty medication(s) and dose(s) confirmed: yes    Refill Questions      Flowsheet Row Most Recent Value   Changes to allergies? No   Changes to medications? Yes  [Adding Nurtec]   New conditions or infections since last clinic visit No   Unplanned office visit, urgent care, ED, or hospital admission in the last 4 weeks  No   How does patient/caregiver feel medication is working? Good   Financial problems or insurance changes  No   If yes, describe changes in insurance or financial issues. N/A   Since the previous refill, were any specialty medication doses or scheduled injections missed or delayed?  No   If yes, please provide the amount N/A   Why were doses missed? N/A   Does this patient require a clinical escalation to a pharmacist? Yes            Delivery Questions      Flowsheet Row Most Recent Value   Delivery method UPS   Delivery address verified with patient/caregiver? Yes   Delivery address Home   Number of medications in delivery 2   Medication(s) being filled and delivered Erenumab-Aooe, Rimegepant Sulfate   Doses left of specialty medications Aimovig= Nurtec=New Start   Copay verified? Yes   Copay amount Aimovig/Nurtec =$0   Copay form of payment No copayment ($0)   Ship Date 9/6   Delivery Date 9/7   Signature Required Yes              Medication Adherence    Adherence tools used: calendar  Support network for adherence: healthcare provider   Other support network: Pharmacy             Follow-up: 28 day(s)     Lisa Root, Pharmacy Technician  Specialty Pharmacy Technician

## 2024-10-01 ENCOUNTER — SPECIALTY PHARMACY (OUTPATIENT)
Dept: ONCOLOGY | Facility: HOSPITAL | Age: 34
End: 2024-10-01
Payer: COMMERCIAL

## 2024-10-01 NOTE — PROGRESS NOTES
Specialty Pharmacy Refill Coordination Note     Dominga is a 34 y.o. female contacted today regarding refills of  Aimovig and Nurtec specialty medication(s). Patient is due for injection on 10/13.      Reviewed and verified with patient:       Specialty medication(s) and dose(s) confirmed: yes    Refill Questions      Flowsheet Row Most Recent Value   Changes to allergies? No   Changes to medications? No   New conditions or infections since last clinic visit No   Unplanned office visit, urgent care, ED, or hospital admission in the last 4 weeks  No   How does patient/caregiver feel medication is working? Good   Financial problems or insurance changes  No   If yes, describe changes in insurance or financial issues. N/A   Since the previous refill, were any specialty medication doses or scheduled injections missed or delayed?  No   If yes, please provide the amount N/A   Why were doses missed? N/A   Does this patient require a clinical escalation to a pharmacist? No            Delivery Questions      Flowsheet Row Most Recent Value   Delivery method UPS   Delivery address verified with patient/caregiver? Yes   Delivery address Home   Number of medications in delivery 2   Medication(s) being filled and delivered Rimegepant Sulfate, Erenumab-Aooe   Doses left of specialty medications Aimovig=0 Nurtec=New Start   Copay verified? Yes   Copay amount Aimovig/Nurtec =$0   Copay form of payment No copayment ($0)   Ship Date 10/4   Delivery Date 10/5   Signature Required Yes              Medication Adherence    Adherence tools used: calendar  Support network for adherence: healthcare provider   Other support network: Pharmacy             Follow-up: 28 day(s)     Lisa Root, Pharmacy Technician  Specialty Pharmacy Technician

## 2024-11-06 ENCOUNTER — SPECIALTY PHARMACY (OUTPATIENT)
Dept: ONCOLOGY | Facility: HOSPITAL | Age: 34
End: 2024-11-06
Payer: COMMERCIAL

## 2024-11-06 NOTE — PROGRESS NOTES
Specialty Pharmacy Refill Coordination Note     Dominga is a 34 y.o. female contacted today regarding refills of her specialty medication(s).    Specialty medication(s) and dose(s) confirmed: nurtec 75 mg po prn, aimovig 140 mg [id number]  Changes to medications: no  Changes to insurance: no  Reviewed and verified with patient:         Refill Questions      Flowsheet Row Most Recent Value   Changes to allergies? No   Changes to medications? No   New conditions or infections since last clinic visit No   Financial problems or insurance changes  No   Does this patient require a clinical escalation to a pharmacist? No            Delivery Questions      Flowsheet Row Most Recent Value   Delivery method FedEx  [Recipient locked in to [facility] and Boston Anguiano as provider for insurance processing]   Delivery address verified with patient/caregiver? Yes   Delivery address Home   Number of medications in delivery 2   Medication(s) being filled and delivered Rimegepant Sulfate (Nurtec), Erenumab-aooe (AIMOVIG)   Copay verified? Yes   Copay amount 0$ for each   Copay form of payment No copayment ($0)   Ship Date 11/8   Delivery Date 11/9   Signature Required Yes            Medication Adherence    Adherence tools used: calendar  Support network for adherence: healthcare provider   Other support network: Pharmacy             Recipient locked in to [facility] and Boston Anguiano as provider for insurance processing    Follow-up: 3.5 week(s)     Kye Curtis, PharmD  11/6/2024   09:38 EST

## 2024-11-19 ENCOUNTER — PROCEDURE VISIT (OUTPATIENT)
Dept: NEUROLOGY | Facility: CLINIC | Age: 34
End: 2024-11-19
Payer: COMMERCIAL

## 2024-11-19 ENCOUNTER — OFFICE VISIT (OUTPATIENT)
Dept: NEUROLOGY | Facility: CLINIC | Age: 34
End: 2024-11-19
Payer: COMMERCIAL

## 2024-11-19 VITALS
WEIGHT: 147.4 LBS | DIASTOLIC BLOOD PRESSURE: 60 MMHG | SYSTOLIC BLOOD PRESSURE: 106 MMHG | HEART RATE: 86 BPM | BODY MASS INDEX: 27.12 KG/M2 | HEIGHT: 62 IN | OXYGEN SATURATION: 99 %

## 2024-11-19 DIAGNOSIS — R20.0 NUMBNESS AND TINGLING: Primary | ICD-10-CM

## 2024-11-19 DIAGNOSIS — G56.20 CUBITAL TUNNEL SYNDROME, UNSPECIFIED LATERALITY: Primary | ICD-10-CM

## 2024-11-19 DIAGNOSIS — R20.2 NUMBNESS AND TINGLING: Primary | ICD-10-CM

## 2024-11-19 DIAGNOSIS — G43.709 CHRONIC MIGRAINE WITHOUT AURA WITHOUT STATUS MIGRAINOSUS, NOT INTRACTABLE: ICD-10-CM

## 2024-11-19 PROCEDURE — 99213 OFFICE O/P EST LOW 20 MIN: CPT | Performed by: NURSE PRACTITIONER

## 2024-11-19 PROCEDURE — 1160F RVW MEDS BY RX/DR IN RCRD: CPT | Performed by: NURSE PRACTITIONER

## 2024-11-19 PROCEDURE — 1159F MED LIST DOCD IN RCRD: CPT | Performed by: NURSE PRACTITIONER

## 2024-11-19 PROCEDURE — 95886 MUSC TEST DONE W/N TEST COMP: CPT | Performed by: PSYCHIATRY & NEUROLOGY

## 2024-11-19 PROCEDURE — 95911 NRV CNDJ TEST 9-10 STUDIES: CPT | Performed by: PSYCHIATRY & NEUROLOGY

## 2024-11-19 RX ORDER — METRONIDAZOLE 500 MG/1
500 TABLET ORAL 2 TIMES DAILY
COMMUNITY
Start: 2024-11-15

## 2024-11-19 RX ORDER — ESCITALOPRAM OXALATE 10 MG/1
10 TABLET ORAL DAILY
COMMUNITY
Start: 2024-11-18 | End: 2025-02-16

## 2024-11-19 NOTE — PROGRESS NOTES
[facility]   Electrodiagnostic Laboratory    Nerve Conduction & EMG Report        Patient:   Dominga Suarez   Patient ID: [id number]   YOB: 1990  Sex:   female      Exam Physician:  David Patel MD  Refer Physician:  Boston CHAVEZ    Electromyogram and Nerve Conduction Velocity Procedure Note    Hx: 34 y.o. right handed female with complaint of numbness involving the bilateral hands. Symptoms have been present for 1 year and were provoked by no clear event. Significant past medical history includes nothing suggestive of neuropathy.  Family history no family history of nerve or muscle disease.    Exam: Motor power is normal. There is no atrophy. There are no fasciculations. Deep tendon reflexes are present and symmetrical. Sensory exam is normal.      Edx studies of the B UE  were performed to evaluate for peripheral nerve entrapment.      NCS Examination   For sensory nerve conduction studies, the amplitude is measured peak-to-peak, the latency reported is the distal peak latency, and the conduction velocity, if measured, is determined from onset latencies and is over the forearm.   For motor nerve conduction studies, the amplitude is measured baseline-to-peak, the latency reported is the distal onset latency, the conduction velocity is calculated over the forearm, and the F wave latency is the minimum latency.   Unless otherwise noted, the hand temperature was monitored continuously and remained between 32°C and 36°C during the performance of the NCSs.          Nerve Conduction Studies  Anti Sensory Summary Table     Stim Site NR Norm Peak (ms) O-P Amp (µV) Norm O-P Amp Onset (ms) Site1 Site2 Delta-0 (ms) Dist (cm) Alex (m/s) Norm Alex (m/s)   Left Median Anti Sensory (2nd Digit)   Wrist    <3.6 46.8 >10 1.9 Wrist 2nd Digit 1.9 14.0 74    Right Median Anti Sensory (2nd Digit)   Wrist    <3.6 42.5 >10 1.9 Wrist 2nd Digit 1.9 14.0 74    Left Radial Anti Sensory (Base 1st  Digit)   Wrist    <3.1 20.2  1.2 Wrist Base 1st Digit 1.2 0.0     Right Radial Anti Sensory (Base 1st Digit)   Wrist    <3.1 22.3  1.2 Wrist Base 1st Digit 1.2 0.0     Left Ulnar Anti Sensory (5th Digit)   Wrist    <3.7 26.3 >15.0 1.7 Wrist 5th Digit 1.7 0.0     Right Ulnar Anti Sensory (5th Digit)   Wrist    <3.7 32.3 >15.0 1.8 Wrist 5th Digit 1.8 0.0       Motor Summary Table     Stim Site NR Onset (ms) Norm Onset (ms) O-P Amp (mV) Norm O-P Amp Site1 Site2 Delta-0 (ms) Dist (cm) Alex (m/s) Norm Alex (m/s)   Left Median Motor (Abd Poll Brev)   Wrist    2.7 <4.2 9.0 >5 Elbow Wrist 2.9 20.0 69 >50   Elbow    5.6  8.6          Right Median Motor (Abd Poll Brev)   Wrist    3.0 <4.2 7.1 >5 Elbow Wrist 3.7 20.0 54 >50   Elbow    6.7  3.3          Left Ulnar Motor (Abd Dig Minimi)   Wrist    2.1 <4.2 8.6 >3 B Elbow Wrist 3.5 20.0 57 >53   B Elbow    5.6  7.6  A Elbow B Elbow 1.6 9.0 56 >53   A Elbow    7.2  6.4          Right Ulnar Motor (Abd Dig Minimi)   Wrist    2.0 <4.2 9.1 >3 B Elbow Wrist 3.3 20.0 61 >53   B Elbow    5.3  8.3  A Elbow B Elbow 1.8 8.0 *44 >53   A Elbow    7.1  8.2            F Wave Studies     NR F-Lat (ms) Lat Norm (ms) L-R F-Lat (ms) L-R Lat Norm   Left Median (Mrkrs) (Abd Poll Brev)      24.61 <33 *3.98 <2.2   Right Median (Mrkrs) (Abd Poll Brev)      20.63 <33 *3.98 <2.2   Left Ulnar (Mrkrs) (Abd Dig Min)      26.41 <36 0.47 <2.5   Right Ulnar (Mrkrs) (Abd Dig Min)      26.88 <36 0.47 <2.5       EMG Examination   The study was performed with a concentric needle electrode. Fibrillation and fasciculation activity is graded from none (0) to continuous (4+). The configuration and recruitment pattern of motor unit action potentials under voluntary control, if not normal, are described below       Side Muscle Nerve Root Ins Act Fibs Psw Amp Dur Poly Recrt Int Pat Comment   Right Deltoid Axillary C5-6 Nml Nml Nml Nml Nml 0 Nml Nml    Right Triceps Radial C6-7-8 Nml Nml Nml Nml Nml 0 Nml Nml    Right  Biceps Musculocut C5-6 Nml Nml Nml Nml Nml 0 Nml Nml    Right PronatorTeres Median C6-7 Nml Nml Nml Nml Nml 0 Nml Nml    Right 1stDorInt Ulnar C8-T1 Nml Nml Nml Nml Nml 0 Nml Nml    Left Deltoid Axillary C5-6 Nml Nml Nml Nml Nml 0 Nml Nml    Left Triceps Radial C6-7-8 Nml Nml Nml Nml Nml 0 Nml Nml    Left Biceps Musculocut C5-6 Nml Nml Nml Nml Nml 0 Nml Nml    Left PronatorTeres Median C6-7 Nml Nml Nml Nml Nml 0 Nml Nml    Left 1stDorInt Ulnar C8-T1 Nml Nml Nml Nml Nml 0 Nml Nml        NCV FINDINGS:  Evaluation of the right ulnar motor nerve showed decreased conduction velocity (A Elbow-B Elbow, 44 m/s).  All remaining nerves (as indicated in the following tables) were within normal limits.  All F Wave latencies were within normal limits.      EMG FINDINGS:  All examined muscles (as indicated in the following table) showed no evidence of electrical instability.         Conclusion: This study showed neurophysiologic evidence of a mild right ulnar neuropathy at the elbow, ie cubital tunnel syndrome.           Instrument used:  Teca Synergy        Performed by:          David Patel MD

## 2024-11-19 NOTE — PROGRESS NOTES
Neuro Office Visit      Encounter Date: 2024   Patient Name: Dominga Suarez  : 1990   MRN: [id number]   PCP: Miguel Angel Seo MD  Chief Complaint:    Chief Complaint   Patient presents with    Migraine       History of Present Illness: Dominga Suarez is a 34 y.o. female who is here today in Neurology for  chronic migraine, IIH, BET, paresthesia    Last visit 2024 w me-cont Aimovig, samples of nurtec and ubrelvy. Pt to have eye exam. If papilledema is present will start back on diamox. EMG.    EMG Progress Notes by David Patel MD (2024 13:00)-  Conclusion: This study showed neurophysiologic evidence of a mild right ulnar neuropathy at the elbow, ie cubital tunnel syndrome.   History of Present Illness       IIH  Normal eye exam with no papilledema  Eye examOPHTHALMOLOGY - SCAN - F/U, [facility], 2022 (2022)-still with mild papiledema  Post-Procedure Note by Prabha Barnard PA (2022 07:45)-opening pressure 22cm H20.  Patient is down to 155 pounds and does not wish to lose more weight.     Diamox makes her feel foggy-not allergy, but side effect.     PH  Lost 89 pounds after gastric sleeve  Optic nerve edema on exam  Opening pressure 31 cm H20  MRI nml per pt report     Migraine Headaches  Pt is doing well on Aimovig. Having about 8 headaches a month. 3 are severe.  No side effects.  Using Aimovig which is effective.  Wears off last week. Using imitrex but not effective. Did not  samples of Nurtec.     Days per month: 8  Location:top of head, temples, occipital     Quality: Aching and pressure     Duration: hours to 1 day  Severity: 6/10  Triggers: none  Associated Symptoms: Nausea, Vomiting, Photophobia, Phonophobia,  Vision changes blurred and Tinnitus  Aura: none     Abortives: IBU, excedrin  Preventives: TPM, SSRI, BB contraindicated due to dizziness and low blood sugar. Aimovig            Smoking Cessation  Down to a 1/3 pack  Chantix and  nicotine lozenges. She didn't use chantix. Afraid of side effects.     Paresthesia   1 year ago developed LUE and LLE numbness with pins and needles. Can be burning. It is positional. Does not drink. She is smoking. History of gastric bypass.  No weakness. Works as a .     MRI brain reviewed without lesions.     PMH: Gastric sleeve April 2021, renal stones  Subjective      Past Medical History:   Past Medical History:   Diagnosis Date    Calcium kidney stones     Chronic headaches     Headache, tension-type     Herpes simplex     Migraine        Past Surgical History:   Past Surgical History:   Procedure Laterality Date    DILATATION AND CURETTAGE      GASTRIC SLEEVE LAPAROSCOPIC  04/20/2021    KIDNEY STONE SURGERY      LUMBAR PUNCTURE  09/06/2022    TUBAL ABDOMINAL LIGATION      WRIST SURGERY         Family History:   Family History   Problem Relation Age of Onset    Breast cancer Maternal Grandmother     Migraines Maternal Grandmother     Colon cancer Paternal Grandfather        Social History:   Social History     Socioeconomic History    Marital status: Single   Tobacco Use    Smoking status: Every Day     Current packs/day: 0.50     Average packs/day: 0.5 packs/day for 15.0 years (7.5 ttl pk-yrs)     Types: Cigarettes, Electronic Cigarette    Smokeless tobacco: Never   Vaping Use    Vaping status: Every Day    Substances: Nicotine, Flavoring    Devices: Disposable   Substance and Sexual Activity    Alcohol use: Never    Drug use: Not Currently    Sexual activity: Yes     Partners: Male     Birth control/protection: Condom, Tubal ligation       Medications:     Current Outpatient Medications:     acyclovir (ZOVIRAX) 400 MG tablet, Take 1 tablet by mouth 2 (two) times a day., Disp: , Rfl:     Erenumab-aooe (AIMOVIG) 140 MG/ML auto-injector, Inject 1 mL under the skin into the appropriate area as directed Every 28 (Twenty-Eight) Days., Disp: 1 mL, Rfl: 11    escitalopram (LEXAPRO) 10 MG tablet, Take 1 tablet  by mouth Daily., Disp: , Rfl:     metroNIDAZOLE (FLAGYL) 500 MG tablet, Take 1 tablet by mouth 2 (Two) Times a Day., Disp: , Rfl:     multivitamin with minerals tablet tablet, Take 1 tablet by mouth Daily., Disp: , Rfl:     ondansetron (Zofran) 4 MG tablet, Take 1 tablet by mouth Daily As Needed for Nausea or Vomiting., Disp: 30 tablet, Rfl: 1    OXcarbazepine (TRILEPTAL) 150 MG tablet, Take 1 tablet by mouth 2 (Two) Times a Day., Disp: 60 tablet, Rfl: 5    Rimegepant Sulfate (Nurtec) 75 MG tablet dispersible tablet, Take 1 tablet by mouth Daily As Needed (migraines). Take 1 tablet at the onset of headache, Max of 75 mg/24 hours, Max of 18 tabs/30 days., Disp: 16 tablet, Rfl: 11    Allergies:   No Known Allergies    PHQ-9 Total Score:     Critical access hospital Fall Risk Assessment has not been completed.    Objective     Physical Exam:   Physical Exam  Eyes:      Extraocular Movements: No nystagmus.   Neurological:      Motor: Motor strength is normal.     Coordination: Coordination is intact.      Deep Tendon Reflexes:      Reflex Scores:       Bicep reflexes are 2+ on the right side and 2+ on the left side.       Brachioradialis reflexes are 2+ on the right side and 2+ on the left side.       Patellar reflexes are 2+ on the right side and 2+ on the left side.       Achilles reflexes are 2+ on the right side and 2+ on the left side.  Psychiatric:         Speech: Speech normal.         Neurological Exam  Mental Status  Awake, alert and oriented to person, place and time. Recent and remote memory are intact. Speech is normal. Follows complex commands. Attention and concentration are normal. Fund of knowledge is appropriate for level of education.    Cranial Nerves  CN III, IV, VI: No nystagmus. Normal saccades. Normal smooth pursuit.   Right pupil: Round.   Left pupil: Round.  CN V: Facial sensation is normal.  CN VII: Full and symmetric facial movement.    Motor  Normal muscle bulk throughout. No fasciculations present. Normal  "muscle tone. No abnormal involuntary movements. Strength is 5/5 throughout all four extremities.    Sensory  Sensation is intact to light touch, pinprick, vibration and proprioception in all four extremities.    Reflexes                                            Right                      Left  Brachioradialis                    2+                         2+  Biceps                                 2+                         2+  Patellar                                2+                         2+  Achilles                                2+                         2+    Coordination    Finger-to-nose, rapid alternating movements and heel-to-shin normal bilaterally without dysmetria.    Gait  Normal casual, toe, heel and tandem gait.     Physical Exam        Vital Signs:   Vitals:    11/19/24 1324   BP: 106/60   Pulse: 86   SpO2: 99%   Weight: 66.9 kg (147 lb 6.4 oz)   Height: 157.5 cm (62.01\")     Body mass index is 26.95 kg/m².         Assessment / Plan      Assessment/Plan:   Diagnoses and all orders for this visit:    1. Cubital tunnel syndrome, unspecified laterality (Primary)  Comments:  Reviewed EMG  Orders:  -     Ambulatory Referral to Orthopedic Surgery    2. Chronic migraine without aura without status migrainosus, not intractable  Comments:  Cont Aimovig, lexapro, oxc, nurtec       Assessment & Plan          Patient Education:       Reviewed medications, potential side effects and signs and symptoms to report. Discussed risk versus benefits of treatment plan with patient and/or family-including medications, labs and radiology that may be ordered. Addressed questions and concerns during visit. Patient and/or family verbalized understanding and agree with plan. Instructed to call the office with any questions and report to ER with any life-threatening symptoms.     Follow Up:   Return in about 6 months (around 5/19/2025) for Recheck.    During this visit the following were done:  Labs Reviewed [x]    Labs " Ordered []    Radiology Reports Reviewed []    Radiology Ordered []    PCP Records Reviewed []    Referring Provider Records Reviewed []    ER Records Reviewed []    Hospital Records Reviewed []    History Obtained From Family []    Radiology Images Reviewed []    Other Reviewed [x]    Records Requested []      Patient or patient representative verbalized consent for the use of Ambient Listening during the visit with  Boston Anguiano DNP, APRN for chart documentation. 11/20/2024  09:55 EST      [place] Anguiano DNP, APRN

## 2024-11-19 NOTE — LETTER
2024     Miguel Angel Seo MD  [address] [id number]    Patient: Dominga Suarez   YOB: 1990   Date of Visit: 2024     Dear Miguel Angel Seo MD:       Thank you for referring Dominga Suarez to me for evaluation. Below are the relevant portions of my assessment and plan of care.    If you have questions, please do not hesitate to call me. I look forward to following Dominga along with you.         Sincerely,        Boston Anguiano DNP, APRN        CC: No Recipients    [place] Anguiano DNP, APRN  24 [phone]  Signed     Neuro Office Visit      Encounter Date: 2024   Patient Name: Dominga Suarez  : 1990   MRN: [id number]   PCP: Miguel Angel Seo MD  Chief Complaint:    Chief Complaint   Patient presents with   • Migraine       History of Present Illness: Dominga Suarez is a 34 y.o. female who is here today in Neurology for  chronic migraine, IIH, BET, paresthesia    Last visit 2024 w me-cont Aimovig, samples of nurtec and ubrelvy. Pt to have eye exam. If papilledema is present will start back on diamox. EMG.    EMG Progress Notes by David Patel MD (2024 13:00)-  Conclusion: This study showed neurophysiologic evidence of a mild right ulnar neuropathy at the elbow, ie cubital tunnel syndrome.   History of Present Illness       IIH  Normal eye exam with no papilledema  Eye examOPHTHALMOLOGY - SCAN - F/U, [facility], 2022 (2022)-still with mild papiledema  Post-Procedure Note by Prabha Barnard PA (2022 07:45)-opening pressure 22cm H20.  Patient is down to 155 pounds and does not wish to lose more weight.     Diamox makes her feel foggy-not allergy, but side effect.     PH  Lost 89 pounds after gastric sleeve  Optic nerve edema on exam  Opening pressure 31 cm H20  MRI nml per pt report     Migraine Headaches  Pt is doing well on Aimovig. Having about 8 headaches a month. 3 are  severe.  No side effects.  Using Aimovig which is effective.  Wears off last week. Using imitrex but not effective. Did not  samples of Nurtec.     Days per month: 8  Location:top of head, temples, occipital     Quality: Aching and pressure     Duration: hours to 1 day  Severity: 6/10  Triggers: none  Associated Symptoms: Nausea, Vomiting, Photophobia, Phonophobia,  Vision changes blurred and Tinnitus  Aura: none     Abortives: IBU, excedrin  Preventives: TPM, SSRI, BB contraindicated due to dizziness and low blood sugar. Aimovig            Smoking Cessation  Down to a 1/3 pack  Chantix and nicotine lozenges. She didn't use chantix. Afraid of side effects.     Paresthesia   1 year ago developed LUE and LLE numbness with pins and needles. Can be burning. It is positional. Does not drink. She is smoking. History of gastric bypass.  No weakness. Works as a .     MRI brain reviewed without lesions.     PMH: Gastric sleeve April 2021, renal stones  Subjective      Past Medical History:   Past Medical History:   Diagnosis Date   • Calcium kidney stones    • Chronic headaches    • Headache, tension-type    • Herpes simplex    • Migraine        Past Surgical History:   Past Surgical History:   Procedure Laterality Date   • DILATATION AND CURETTAGE     • GASTRIC SLEEVE LAPAROSCOPIC  04/20/2021   • KIDNEY STONE SURGERY     • LUMBAR PUNCTURE  09/06/2022   • TUBAL ABDOMINAL LIGATION     • WRIST SURGERY         Family History:   Family History   Problem Relation Age of Onset   • Breast cancer Maternal Grandmother    • Migraines Maternal Grandmother    • Colon cancer Paternal Grandfather        Social History:   Social History     Socioeconomic History   • Marital status: Single   Tobacco Use   • Smoking status: Every Day     Current packs/day: 0.50     Average packs/day: 0.5 packs/day for 15.0 years (7.5 ttl pk-yrs)     Types: Cigarettes, Electronic Cigarette   • Smokeless tobacco: Never   Vaping Use   • Vaping  status: Every Day   • Substances: Nicotine, Flavoring   • Devices: Disposable   Substance and Sexual Activity   • Alcohol use: Never   • Drug use: Not Currently   • Sexual activity: Yes     Partners: Male     Birth control/protection: Condom, Tubal ligation       Medications:     Current Outpatient Medications:   •  acyclovir (ZOVIRAX) 400 MG tablet, Take 1 tablet by mouth 2 (two) times a day., Disp: , Rfl:   •  Erenumab-aooe (AIMOVIG) 140 MG/ML auto-injector, Inject 1 mL under the skin into the appropriate area as directed Every 28 (Twenty-Eight) Days., Disp: 1 mL, Rfl: 11  •  escitalopram (LEXAPRO) 10 MG tablet, Take 1 tablet by mouth Daily., Disp: , Rfl:   •  metroNIDAZOLE (FLAGYL) 500 MG tablet, Take 1 tablet by mouth 2 (Two) Times a Day., Disp: , Rfl:   •  multivitamin with minerals tablet tablet, Take 1 tablet by mouth Daily., Disp: , Rfl:   •  ondansetron (Zofran) 4 MG tablet, Take 1 tablet by mouth Daily As Needed for Nausea or Vomiting., Disp: 30 tablet, Rfl: 1  •  OXcarbazepine (TRILEPTAL) 150 MG tablet, Take 1 tablet by mouth 2 (Two) Times a Day., Disp: 60 tablet, Rfl: 5  •  Rimegepant Sulfate (Nurtec) 75 MG tablet dispersible tablet, Take 1 tablet by mouth Daily As Needed (migraines). Take 1 tablet at the onset of headache, Max of 75 mg/24 hours, Max of 18 tabs/30 days., Disp: 16 tablet, Rfl: 11    Allergies:   No Known Allergies    PHQ-9 Total Score:     [facility] Fall Risk Assessment has not been completed.    Objective     Physical Exam:   Physical Exam  Eyes:      Extraocular Movements: No nystagmus.   Neurological:      Motor: Motor strength is normal.     Coordination: Coordination is intact.      Deep Tendon Reflexes:      Reflex Scores:       Bicep reflexes are 2+ on the right side and 2+ on the left side.       Brachioradialis reflexes are 2+ on the right side and 2+ on the left side.       Patellar reflexes are 2+ on the right side and 2+ on the left side.       Achilles reflexes are 2+ on the  "right side and 2+ on the left side.  Psychiatric:         Speech: Speech normal.         Neurological Exam  Mental Status  Awake, alert and oriented to person, place and time. Recent and remote memory are intact. Speech is normal. Follows complex commands. Attention and concentration are normal. Fund of knowledge is appropriate for level of education.    Cranial Nerves  CN III, IV, VI: No nystagmus. Normal saccades. Normal smooth pursuit.   Right pupil: Round.   Left pupil: Round.  CN V: Facial sensation is normal.  CN VII: Full and symmetric facial movement.    Motor  Normal muscle bulk throughout. No fasciculations present. Normal muscle tone. No abnormal involuntary movements. Strength is 5/5 throughout all four extremities.    Sensory  Sensation is intact to light touch, pinprick, vibration and proprioception in all four extremities.    Reflexes                                            Right                      Left  Brachioradialis                    2+                         2+  Biceps                                 2+                         2+  Patellar                                2+                         2+  Achilles                                2+                         2+    Coordination    Finger-to-nose, rapid alternating movements and heel-to-shin normal bilaterally without dysmetria.    Gait  Normal casual, toe, heel and tandem gait.     Physical Exam        Vital Signs:   Vitals:    11/19/24 1324   BP: 106/60   Pulse: 86   SpO2: 99%   Weight: 66.9 kg (147 lb 6.4 oz)   Height: 157.5 cm (62.01\")     Body mass index is 26.95 kg/m².         Assessment / Plan      Assessment/Plan:   Diagnoses and all orders for this visit:    1. Cubital tunnel syndrome, unspecified laterality (Primary)  Comments:  Reviewed EMG  Orders:  -     Ambulatory Referral to Orthopedic Surgery    2. Chronic migraine without aura without status migrainosus, not intractable  Comments:  Cont Aimovig, lexapro, ox, " nurtec       Assessment & Plan          Patient Education:       Reviewed medications, potential side effects and signs and symptoms to report. Discussed risk versus benefits of treatment plan with patient and/or family-including medications, labs and radiology that may be ordered. Addressed questions and concerns during visit. Patient and/or family verbalized understanding and agree with plan. Instructed to call the office with any questions and report to ER with any life-threatening symptoms.     Follow Up:   Return in about 6 months (around 5/19/2025) for Recheck.    During this visit the following were done:  Labs Reviewed [x]    Labs Ordered []    Radiology Reports Reviewed []    Radiology Ordered []    PCP Records Reviewed []    Referring Provider Records Reviewed []    ER Records Reviewed []    Hospital Records Reviewed []    History Obtained From Family []    Radiology Images Reviewed []    Other Reviewed [x]    Records Requested []      Patient or patient representative verbalized consent for the use of Ambient Listening during the visit with  Boston Anguiano DNP, APRN for chart documentation. 11/20/2024  09:55 EST      Boston Anguiano DNP, APRN

## 2024-12-03 ENCOUNTER — SPECIALTY PHARMACY (OUTPATIENT)
Dept: ONCOLOGY | Facility: HOSPITAL | Age: 34
End: 2024-12-03
Payer: COMMERCIAL

## 2024-12-10 ENCOUNTER — SPECIALTY PHARMACY (OUTPATIENT)
Dept: ONCOLOGY | Facility: HOSPITAL | Age: 34
End: 2024-12-10
Payer: COMMERCIAL

## 2024-12-10 NOTE — PROGRESS NOTES
Specialty Pharmacy Patient Management Program  [facility]      Dominga is a 34 y.o. female contacted today regarding refills of her medication(s).    Specialty medication(s) and dose(s) confirmed: Aimovig  Other medications being refilled: none    Refill Questions      Flowsheet Row Most Recent Value   Changes to allergies? No   Changes to medications? No   New conditions or infections since last clinic visit No   Unplanned office visit, urgent care, ED, or hospital admission in the last 4 weeks  No   How does patient/caregiver feel medication is working? Good   Financial problems or insurance changes  No   If yes, describe changes in insurance or financial issues. N/A   Since the previous refill, were any specialty medication doses or scheduled injections missed or delayed?  No   If yes, please provide the amount N/A   Why were doses missed? N/A   Does this patient require a clinical escalation to a pharmacist? No            Delivery Questions      Flowsheet Row Most Recent Value   Delivery method [employer]   Delivery address verified with patient/caregiver? Yes   Delivery address Prescription   Number of medications in delivery 1   Medication(s) being filled and delivered Erenumab-aooe (AIMOVIG)   Doses left of specialty medications Aimovig=0 Nurtec=several tablets   Copay verified? Yes   Copay amount Aimovig co-pay $0   Copay form of payment No copayment ($0)   Ship Date 12/11   Delivery Date 12/12   Signature Required Yes            Medication Adherence    Adherence tools used: calendar  Support network for adherence: healthcare provider   Other support network: Pharmacy               Follow-up: 25 days     Romina Helm, PharmD  Specialty Pharmacist  12/10/2024  14:56 EST

## 2024-12-16 ENCOUNTER — OFFICE VISIT (OUTPATIENT)
Dept: ORTHOPEDIC SURGERY | Facility: CLINIC | Age: 34
End: 2024-12-16
Payer: COMMERCIAL

## 2024-12-16 VITALS
HEIGHT: 62 IN | DIASTOLIC BLOOD PRESSURE: 76 MMHG | WEIGHT: 147 LBS | SYSTOLIC BLOOD PRESSURE: 106 MMHG | BODY MASS INDEX: 27.05 KG/M2

## 2024-12-16 DIAGNOSIS — G56.21 CUBITAL TUNNEL SYNDROME ON RIGHT: Primary | ICD-10-CM

## 2024-12-16 PROCEDURE — 1159F MED LIST DOCD IN RCRD: CPT | Performed by: STUDENT IN AN ORGANIZED HEALTH CARE EDUCATION/TRAINING PROGRAM

## 2024-12-16 PROCEDURE — 1160F RVW MEDS BY RX/DR IN RCRD: CPT | Performed by: STUDENT IN AN ORGANIZED HEALTH CARE EDUCATION/TRAINING PROGRAM

## 2024-12-16 PROCEDURE — 99204 OFFICE O/P NEW MOD 45 MIN: CPT | Performed by: STUDENT IN AN ORGANIZED HEALTH CARE EDUCATION/TRAINING PROGRAM

## 2024-12-16 NOTE — PROGRESS NOTES
[facility] Orthopedic     Office Visit       Date: 12/16/2024   Patient Name: Dominga Suarez  MRN: [id number]  YOB: 1990    Referring Physician: Boston Anguiano D*     Chief Complaint:   Chief Complaint   Patient presents with    Right Elbow - Pain, Initial Evaluation     History of Present Illness:   Dominga Suarez is a 34 y.o. female right-hand-dominant presented clinic as a new patient complaints of right elbow pain and numbness and tingling.  Symptoms have present for 6 months and are worsening with time.  She endorses discomfort to the medial aspect of her elbow with associated numbness and tingling.  This is worse at night.  This creates numbness and tingling in the small finger with pain rating up the arm.  She presents today with an EMG.  She denies any prior bracing or injections.  No other complaints or concerns.    PMH: Chronic migraines, IV drug use in remission, opioid dependence.    Subjective   Review of Systems:   Review of Systems   Constitutional: Negative.  Negative for chills, fatigue and fever.   HENT: Negative.  Negative for congestion and dental problem.    Eyes: Negative.  Negative for blurred vision.   Respiratory: Negative.  Negative for shortness of breath.    Cardiovascular: Negative.  Negative for leg swelling.   Gastrointestinal: Negative.  Negative for abdominal pain.   Endocrine: Negative.  Negative for polyuria.   Genitourinary: Negative.  Negative for difficulty urinating.   Musculoskeletal:  Positive for arthralgias.   Skin: Negative.    Allergic/Immunologic: Negative.    Neurological: Negative.    Hematological: Negative.  Negative for adenopathy.   Psychiatric/Behavioral: Negative.  Negative for behavioral problems.       Pertinent review of systems per HPI    I reviewed the patient's chief complaint, history of present illness, review of systems, past medical history, surgical history,  "family history, social history, medications and allergy list in the EMR on 12/16/2024 and agree with the findings above.    Objective    Vital Signs:   Vitals:    12/16/24 0956   BP: 106/76   Weight: 66.7 kg (147 lb)   Height: 157.5 cm (62.01\")     BMI:      General: No acute distress. Alert and oriented.   Cardiovascular: Palpable radial pulse.   Respiratory: Breathing is nonlabored.     Ortho Exam:  Examination of the right Upper Extremity:  No skin lesions or ecchymosis. No edema.    Interosseous wasting is not visualized   Thenar atrophy is not visualized     Hypothenar atrophy is not visualized     negative Tinel's at the carpal tunnel, negative Durkan's compression test, negative Phalen's maneuver   negative Tinel's over Guyon's canal   positive Tinel's at the ulnar nerve at the elbow   positive Elbow Flexion Test   negative Subluxation of the ulnar nerve   negative tenderness with deep palpation of the pronator   negative Tinel's with median nerve at pronator   negative Froment's sign   5/5 APB strength   5/5 FDI strength   Sensation is decreased to light touch over the ulnar nerve distribution intact at the median nerve distribution   Digits warm and well-perfused      Imaging / Studies:    Imaging Results (Last 24 Hours)       ** No results found for the last 24 hours. **        EMG nerve conduction study performed on 11/19/2024 demonstrates mild right cubital tunnel syndrome.  No fibrillations are noted.    Assessment / Plan    Assessment/Plan:   Dominga Suarez is a 34 y.o. female with mild right cubital tunnel syndrome.    I discussed with the patient their clinical and electrodiagnostic findings demonstrate cubital tunnel syndrome.  We had a lengthy discussion regarding the pathophysiology of their diagnosis which includes compression of the ulnar nerve at the level of the elbow.  Both conservative and surgical options were discussed.  Conservative treatments in the form of: observation, activity " modification, gentle nerve gliding exercises, and night time partial extension splinting were presented. In regards to night time partial extension splinting, both the use of a pillow wrapped around the elbow as well as the use of a rolled-up towel and lightly applied elastic bandage were discussed.  Operative treatments in the form of ulnar nerve decompression in situ release versus transposition were presented. Transposition of the nerve, likely in a subcutaneous versus a submuscular fashion, would be determined based on stability/subluxation at the time of surgery. After expressing understanding of all options, the patient elects to proceed with nighttime splinting, positional modifications, and nerve glides.  I will see her back in 6 weeks for reevaluation.  If not improving we may consider surgical release at that time.  They were agreeable with the plan.  All questions and concerns were addressed.       ICD-10-CM ICD-9-CM   1. Cubital tunnel syndrome on right  G56.21 354.2     Follow Up:   Return in about 6 weeks (around 1/27/2025) for Follow Up.      Amanda Lanza MD  [facility] [place] Orthopedic & Hand Surgeon

## 2024-12-26 ENCOUNTER — SPECIALTY PHARMACY (OUTPATIENT)
Dept: ONCOLOGY | Facility: HOSPITAL | Age: 34
End: 2024-12-26
Payer: COMMERCIAL

## 2025-01-03 ENCOUNTER — SPECIALTY PHARMACY (OUTPATIENT)
Dept: ONCOLOGY | Facility: HOSPITAL | Age: 35
End: 2025-01-03
Payer: COMMERCIAL

## 2025-01-03 NOTE — PROGRESS NOTES
Specialty Pharmacy Refill Coordination Note     Dominga is a 34 y.o. female contacted today regarding refills of Aimovig and Nurtec specialty medication(s).Patient is due for injection on 01/13.    Reviewed and verified with patient:       Specialty medication(s) and dose(s) confirmed: yes    Refill Questions      Flowsheet Row Most Recent Value   Changes to allergies? No   Changes to medications? No   New conditions or infections since last clinic visit No   Unplanned office visit, urgent care, ED, or hospital admission in the last 4 weeks  No   How does patient/caregiver feel medication is working? Good   Financial problems or insurance changes  No   If yes, describe changes in insurance or financial issues. N/A   Since the previous refill, were any specialty medication doses or scheduled injections missed or delayed?  No   If yes, please provide the amount N/A   Why were doses missed? N/A   Does this patient require a clinical escalation to a pharmacist? No            Delivery Questions      Flowsheet Row Most Recent Value   Delivery method UPS   Delivery address verified with patient/caregiver? Yes   Delivery address Home   Number of medications in delivery 2   Medication(s) being filled and delivered Rimegepant Sulfate (Nurtec), Erenumab-aooe (AIMOVIG)   Doses left of specialty medications Nurtec 3 tablets left as of 01/03   Copay verified? Yes   Copay amount Nurtec and Aimovig =$0   Copay form of payment No copayment ($0)   Ship Date 01/08   Delivery Date 01/09   Signature Required Yes              Medication Adherence    Adherence tools used: calendar  Support network for adherence: healthcare provider   Other support network: Pharmacy             Follow-up: 28 day(s)     Sherrill Maddox  Specialty Pharmacy Technician

## 2025-02-10 ENCOUNTER — SPECIALTY PHARMACY (OUTPATIENT)
Dept: ONCOLOGY | Facility: HOSPITAL | Age: 35
End: 2025-02-10
Payer: COMMERCIAL

## 2025-02-10 NOTE — PROGRESS NOTES
Specialty Pharmacy Refill Coordination Note     Dominga is a 34 y.o. female contacted today regarding refills of  Nurtec and Aimovig specialty medication(s). Patient's Aimovig injection due 2/17/25    Reviewed and verified with patient:       Specialty medication(s) and dose(s) confirmed: yes    Refill Questions      Flowsheet Row Most Recent Value   Changes to allergies? No   Changes to medications? No   New conditions or infections since last clinic visit No   Unplanned office visit, urgent care, ED, or hospital admission in the last 4 weeks  No   How does patient/caregiver feel medication is working? Good   Financial problems or insurance changes  No   If yes, describe changes in insurance or financial issues. N/A   Since the previous refill, were any specialty medication doses or scheduled injections missed or delayed?  No   If yes, please provide the amount N/A   Why were doses missed? N/A   Does this patient require a clinical escalation to a pharmacist? No            Delivery Questions      Flowsheet Row Most Recent Value   Delivery method UPS   Delivery address verified with patient/caregiver? Yes   Delivery address Home   Number of medications in delivery 2   Medication(s) being filled and delivered Erenumab-aooe (AIMOVIG), Rimegepant Sulfate (Nurtec)   Doses left of specialty medications 0 Aimovig, and 3 Nurtec   Copay verified? Yes   Copay amount Nurtec and Aimovig copay both $0   Copay form of payment No copayment ($0)   Ship Date 2/14/25   Delivery Date Selection 02/15/25   Signature Required Yes              Medication Adherence    Adherence tools used: calendar  Support network for adherence: healthcare provider   Other support network: Pharmacy             Follow-up: 28 day(s)     Moise Jones, Pharmacy Technician  Specialty Pharmacy Technician

## 2025-02-12 NOTE — PROGRESS NOTES
Specialty Pharmacy Patient Management Program  Neurology Reassessment     Dominga Suarez is a 34 y.o. female with migraines seen by a Neurology provider and enrolled in the Neurology Patient Management program offered by [facility].  A follow-up outreach was conducted, including assessment of continued therapy appropriateness, medication adherence, and side effect incidence and management for Aimovig and Nurtec.     Changes to Insurance Coverage or Financial Support  [place] Medicaid     Relevant Past Medical History and Comorbidities  Relevant medical history and concomitant health conditions were discussed with the patient. The patient's chart has been reviewed for relevant past medical history and comorbid health conditions and updated as necessary.   Past Medical History:   Diagnosis Date    Calcium kidney stones     Chronic headaches     Headache, tension-type     Herpes simplex     Migraine      Social History     Socioeconomic History    Marital status: Single   Tobacco Use    Smoking status: Every Day     Current packs/day: 0.50     Average packs/day: 0.5 packs/day for 15.0 years (7.5 ttl pk-yrs)     Types: Cigarettes, Electronic Cigarette    Smokeless tobacco: Never   Vaping Use    Vaping status: Every Day    Substances: Nicotine, Flavoring    Devices: Disposable   Substance and Sexual Activity    Alcohol use: Never    Drug use: Not Currently    Sexual activity: Yes     Partners: Male     Birth control/protection: Condom, Tubal ligation     Problem list reviewed by Romina Helm, PharmD on 2/12/2025 at  4:07 PM    Hospitalizations and Urgent Care Since Last Assessment  ED Visits, Admissions, or Hospitalizations: none   Urgent Office Visits: none     Allergies  Known allergies and reactions were discussed with the patient. The patient's chart has been reviewed for allergy information and updated as necessary.   No Known Allergies  Allergies reviewed by Romina Helm, PharmD on  2/12/2025 at  4:07 PM    Relevant Laboratory Values  Common labs          3/15/2024    14:55 11/14/2024    09:30   Common Labs   WBC 10.76     5.61       Hemoglobin 12.9     11.7       Hematocrit 39.6     37.4       Platelets 222     256       Hemoglobin A1C  4.9          Details          This result is from an external source.                   Current Medication List  This medication list has been reviewed with the patient and evaluated for any interactions or necessary modifications/recommendations, and updated to include all prescription medications, OTC medications, and supplements the patient is currently taking.  This list reflects what is contained in the patient's profile, which has also been marked as reviewed to communicate to other providers it is the most up to date version of the patient's current medication therapy.     Current Outpatient Medications:     acyclovir (ZOVIRAX) 400 MG tablet, Take 1 tablet by mouth 2 (two) times a day., Disp: , Rfl:     Erenumab-aooe (AIMOVIG) 140 MG/ML auto-injector, Inject 1 mL under the skin into the appropriate area as directed Every 28 (Twenty-Eight) Days., Disp: 1 mL, Rfl: 11    escitalopram (LEXAPRO) 10 MG tablet, Take 1 tablet by mouth Daily., Disp: , Rfl:     metroNIDAZOLE (FLAGYL) 500 MG tablet, Take 1 tablet by mouth 2 (Two) Times a Day., Disp: , Rfl:     multivitamin with minerals tablet tablet, Take 1 tablet by mouth Daily., Disp: , Rfl:     ondansetron (Zofran) 4 MG tablet, Take 1 tablet by mouth Daily As Needed for Nausea or Vomiting., Disp: 30 tablet, Rfl: 1    OXcarbazepine (TRILEPTAL) 150 MG tablet, Take 1 tablet by mouth 2 (Two) Times a Day., Disp: 60 tablet, Rfl: 5    rimegepant sulfate ODT (Nurtec) 75 MG tablet, Take 1 tablet at the onset of headache, Max of 75 mg/24 hours, Max of 18 tabs/30 days., Disp: 16 tablet, Rfl: 11    Medicines reviewed by Romina Helm, PharmD on 2/12/2025 at  4:07 PM    Drug Interactions  No relevant drug-drug  interactions with specialty medication(s):  Aimovig and Nurtec.        Adverse Drug Reactions  Medication tolerability: Tolerating with no to minimal ADRs          Medication plan: Continue therapy with normal follow-up  Plan for ADR Management: not required      Adherence, Self-Administration, and Current Therapy Problems  Adherence related patient's specialty therapy was discussed with the patient. The Adherence segment of this outreach has been reviewed and updated.   Adherence Questions  Linked Medication(s) Assessed: Erenumab-aooe (AIMOVIG), Rimegepant Sulfate (Nurtec)  On average, how many doses/injections does the patient miss per month?: 0  What are the identified reasons for non-adherence or missed doses? : no problems identified  What is the estimated medication adherence level?: %  Based on the patient/caregiver response and refill history, does this patient require an MTP to track adherence improvements?: no    Additional Barriers to Patient Self-Administration: none  Methods for Supporting Patient Self-Administration: pt adept with Emgality self-injections.    Recently Close Medication Therapy Problems  No medication therapy recommendations to display  Open Medication Therapy Problems  No medication therapy recommendations to display     Goals of Therapy  Goals related to the patient's specialty therapy was discussed with the patient. The Patient Goals segment of this outreach has been reviewed and updated.    Goals Addressed Today        Specialty Pharmacy General Goal        On Average, Reduce:   Frequency of migraines by 50%   Symptom severity by 90 % within 2 hours of taking acute therapy.     Baseline Values/Notes on Enrollment  Frequency: daily  Symptom Severity: modeate to severe  Duration:  Hours to days      Date of Reassessment Notes on Progress Toward Above Goals   2/12/25 dw Down t0 8 headache days monthly.  Nurtec effective abortive                                                         Quality of Life Assessment   Quality of Life related to the patient's enrollment in the patient management program and services provided was discussed with the patient. The QOL segment of this outreach has been reviewed and updated.   Quality of Life Improvement Scale: 8-Moderately better    Reassessment Plan & Follow-Up  Medication Therapy Changes: Continue Aimovig 140mg subq monthly and Nurtec 75 mg po once daily as needed for migraines. - Take 1 tablet at the onset of headache, Max of 75 mg/24 hours, Max of 18 tabs/30 days.  Related Plans, Therapy Recommendations, or Issues to Be Addressed: none  Pharmacist to perform regular reassessments no more than (6) months from the previous assessment.  Care Coordinator to set up future refill outreaches, coordinate prescription delivery, and escalate clinical questions to pharmacist.     Attestation  Therapeutic appropriateness: Appropriate  I attest the patient was actively involved in and has agreed to the above plan of care. If the prescribed therapy is at any point deemed not appropriate based on the current or future assessments, a consultation will be initiated with the patient's specialty care provider to determine the best course of action. The revised plan of therapy will be documented along with any additional patient education provided. Discussed aforementioned material with patient via telemedicine.    Romina Helm, PharmD, [facility]  Clinic Specialty Pharmacist, Neurology  2/12/2025  16:07 EST

## 2025-02-13 ENCOUNTER — OFFICE VISIT (OUTPATIENT)
Dept: ORTHOPEDIC SURGERY | Facility: CLINIC | Age: 35
End: 2025-02-13
Payer: COMMERCIAL

## 2025-02-13 VITALS
HEIGHT: 62 IN | WEIGHT: 149 LBS | DIASTOLIC BLOOD PRESSURE: 72 MMHG | BODY MASS INDEX: 27.42 KG/M2 | SYSTOLIC BLOOD PRESSURE: 100 MMHG

## 2025-02-13 DIAGNOSIS — G56.21 CUBITAL TUNNEL SYNDROME ON RIGHT: Primary | ICD-10-CM

## 2025-02-13 RX ORDER — VILAZODONE HYDROCHLORIDE 10 MG/1
TABLET ORAL
COMMUNITY
Start: 2025-02-10 | End: 2025-03-10

## 2025-02-13 NOTE — PROGRESS NOTES
[facility] Orthopedic     Office Visit       Date: 02/13/2025   Patient Name: Dominga Suarez  MRN: [id number]  YOB: 1990    Referring Physician: No ref. provider found     Chief Complaint:   Chief Complaint   Patient presents with    Follow-up     8 week follow up -Cubital tunnel syndrome on right      History of Present Illness:   Dominga Suarez is a 34 y.o. female right-hand-dominant presenting to clinic for follow-up of mild right cubital tunnel syndrome. The patient's last clinic visit initiated nighttime splinting, positional modifications, and nerve glides. She reports that her symptoms are largely unchanged since her last clinic visit.  She continues to endorse 5/10 pain to the elbow as well as numbness and tingling.  She feels this affects the long ring and small fingers.  She has been wearing the Heelbo pad at night but states this has not improved her symptoms.  No other complaints or concerns.    PMH: Chronic migraines, IV drug use in remission, opioid dependence.     Subjective   Review of Systems:   Review of Systems   Constitutional:  Negative for chills, fever, unexpected weight gain and unexpected weight loss.   HENT:  Negative for congestion, postnasal drip and rhinorrhea.    Eyes:  Negative for blurred vision.   Respiratory:  Negative for shortness of breath.    Cardiovascular:  Negative for leg swelling.   Gastrointestinal:  Negative for abdominal pain, nausea and vomiting.   Genitourinary:  Negative for difficulty urinating.   Musculoskeletal:  Positive for arthralgias. Negative for gait problem, joint swelling and myalgias.   Skin:  Negative for skin lesions and wound.   Neurological:  Negative for dizziness, weakness, light-headedness and numbness.   Hematological:  Does not bruise/bleed easily.   Psychiatric/Behavioral:  Negative for depressed mood.       Pertinent review of systems per HPI    I reviewed the  "patient's chief complaint, history of present illness, review of systems, past medical history, surgical history, family history, social history, medications and allergy list in the EMR on 02/11/2025 and agree with the findings above.    Objective    Vital Signs:   Vitals:    02/13/25 1132   BP: 100/72   Weight: 67.6 kg (149 lb)   Height: 157.5 cm (62\")     General: No acute distress. Alert and oriented.   Cardiovascular: Palpable radial pulse.   Respiratory: Breathing is nonlabored.   Ortho Exam:  Examination of the right Upper Extremity:  No skin lesions or ecchymosis. No edema.    Interosseous wasting is not visualized   Thenar atrophy is not visualized     Hypothenar atrophy is not visualized     negative Tinel's at the carpal tunnel, negative Durkan's compression test, negative Phalen's maneuver   negative Tinel's over Guyon's canal   positive Tinel's at the ulnar nerve at the elbow   positive Elbow Flexion Test   negative Subluxation of the ulnar nerve   negative tenderness with deep palpation of the pronator   negative Tinel's with median nerve at pronator   negative Froment's sign   5/5 APB strength   5/5 FDI strength   Sensation is decreased to light touch over the ulnar nerve distribution   Digits warm and well-perfused       Imaging / Studies:    Imaging Results (Last 24 Hours)       ** No results found for the last 24 hours. **        EMG nerve conduction study performed on 11/19/2024 demonstrates mild right cubital tunnel syndrome. No fibrillations are noted.     Assessment / Plan    Assessment/Plan:   Dominga Suarez is a 34 y.o. female with mild right cubital tunnel syndrome.    The patient has had no significant change in her symptoms since her last clinic visit.  We discussed additional options moving forward.  My recommendation was for formal elbow nighttime extension splinting and a course of hand therapy.  The patient was agreeable but reports that she will be losing her insurance at the end of " the month.  An order was placed for a custom splint and a home directed exercise program to include ulnar nerve glides.  We will see how the patient progresses with these nonoperative measures.  I will see her back in 3 months for reevaluation.  All question and concerns were addressed.  She is agreeable.      ICD-10-CM ICD-9-CM   1. Cubital tunnel syndrome on right  G56.21 354.2     Follow Up:   Return in about 3 months (around 5/13/2025) for Follow Up.      Amanda Lanza MD  [facility] [place] Orthopedic & Hand Surgeon

## 2025-03-17 ENCOUNTER — SPECIALTY PHARMACY (OUTPATIENT)
Dept: ONCOLOGY | Facility: HOSPITAL | Age: 35
End: 2025-03-17
Payer: COMMERCIAL

## 2025-03-17 NOTE — PROGRESS NOTES
Specialty Pharmacy Patient Management Program  Refill Outreach     Dominga was contacted today regarding refills of their medication(s).    Refill Questions      Flowsheet Row Most Recent Value   Changes to allergies? No   Changes to medications? No   New conditions or infections since last clinic visit No   Unplanned office visit, urgent care, ED, or hospital admission in the last 4 weeks  No   How does patient/caregiver feel medication is working? Good   Financial problems or insurance changes  No   If yes, describe changes in insurance or financial issues. N/A   Since the previous refill, were any specialty medication doses or scheduled injections missed or delayed?  Yes   If yes, please provide the amount N/A   Why were doses missed? N/A   Does this patient require a clinical escalation to a pharmacist? No            Delivery Questions      Flowsheet Row Most Recent Value   Delivery method UPS   Delivery address verified with patient/caregiver? Yes   Delivery address Home   Number of medications in delivery 1   Medication(s) being filled and delivered Erenumab-aooe (AIMOVIG), Rimegepant Sulfate (Nurtec)   Doses left of specialty medications Nurtec 2 tablets left   Copay verified? Yes   Copay amount Aimovig =$5.00 copay   Copay form of payment Credit/debit on file   Delivery Date Selection 03/18/25   Signature Required No            Medication Adherence    Adherence tools used: calendar  Support network for adherence: healthcare provider   Other support network: Pharmacy             Follow-up: 28 day(s)     Sherrill Maddox  3/17/2025  12:37 EDT     Patient called regarding appt in [facility], next available appt 2/28.  Patient also offered to be seen sooner appt on 2/13 or 2/20 in Dr. Hawley office.  Pt preferred to be seen in [facility] on 2/28/18.  Time and location dicussed pt encouraged to call writer or office with any questions or concerns.

## 2025-04-14 ENCOUNTER — SPECIALTY PHARMACY (OUTPATIENT)
Dept: ONCOLOGY | Facility: HOSPITAL | Age: 35
End: 2025-04-14
Payer: COMMERCIAL

## 2025-04-14 NOTE — PROGRESS NOTES
Specialty Pharmacy Patient Management Program  Refill Outreach     Dominga was contacted today regarding refills of their medication(s).    Refill Questions      Flowsheet Row Most Recent Value   Changes to allergies? No   Changes to medications? No   New conditions or infections since last clinic visit No   Unplanned office visit, urgent care, ED, or hospital admission in the last 4 weeks  No   How does patient/caregiver feel medication is working? Good   Financial problems or insurance changes  No   If yes, describe changes in insurance or financial issues. n/a   Since the previous refill, were any specialty medication doses or scheduled injections missed or delayed?  No   If yes, please provide the amount n/a   Why were doses missed? n/a   Does this patient require a clinical escalation to a pharmacist? No            Delivery Questions      Flowsheet Row Most Recent Value   Delivery method UPS   Delivery address verified with patient/caregiver? Yes   Delivery address Home   Other address preferred n/a   Number of medications in delivery 1   Medication(s) being filled and delivered Erenumab-aooe (AIMOVIG)   Doses left of specialty medications plenty of Nurtec on hand, no refill needed per patient   Copay verified? Yes   Copay amount Aimovig = $5   Copay form of payment Credit/debit on file   Delivery Date Selection 04/15/25   Signature Required No            Medication Adherence    Adherence tools used: calendar  Support network for adherence: healthcare provider   Other support network: Pharmacy             Follow-up: 28 day(s)     Moise Jones, Pharmacy Technician  4/14/2025  12:16 EDT

## 2025-05-19 ENCOUNTER — SPECIALTY PHARMACY (OUTPATIENT)
Dept: ONCOLOGY | Facility: HOSPITAL | Age: 35
End: 2025-05-19
Payer: COMMERCIAL

## 2025-05-19 NOTE — PROGRESS NOTES
Specialty Pharmacy Patient Management Program  Refill Outreach     Dominga was contacted today regarding refills of their medication(s).    Refill Questions      Flowsheet Row Most Recent Value   Changes to allergies? No   Changes to medications? No   New conditions or infections since last clinic visit No   How does patient/caregiver feel medication is working? Good   Financial problems or insurance changes  Yes   If yes, describe changes in insurance or financial issues. patient Aimovig =$529.98 copay patient is checking around for new INS coverage   Since the previous refill, were any specialty medication doses or scheduled injections missed or delayed?  Yes   If yes, please provide the amount patient Aimovig =$529.98 copay patient is checking around for new INS coverage   Why were doses missed? patient Aimovig =$529.98 copay patient is checking around for new INS coverage   Does this patient require a clinical escalation to a pharmacist? No            Delivery Questions      Flowsheet Row Most Recent Value   Delivery method UPS   Delivery address verified with patient/caregiver? Yes   Delivery address Home   Other address preferred n/a   Number of medications in delivery 1   Medication(s) being filled and delivered Rimegepant Sulfate (Nurtec)   Doses left of specialty medications patient Aimovig =$529.98 copay patient is checking around for new INS coverage   Copay verified? Yes   Copay amount Nurtec =$0   Copay form of payment No copayment ($0)   Delivery Date Selection 05/20/25   Signature Required No   Do you consent to receive electronic handouts?  Yes            Medication Adherence    Adherence tools used: calendar  Support network for adherence: healthcare provider   Other support network: Pharmacy             Follow-up: 30 day(s)     Sherrill Maddox  5/19/2025  13:42 EDT

## 2025-05-20 ENCOUNTER — TELEPHONE (OUTPATIENT)
Dept: NEUROLOGY | Facility: CLINIC | Age: 35
End: 2025-05-20